# Patient Record
Sex: FEMALE | Race: WHITE | NOT HISPANIC OR LATINO | Employment: OTHER | URBAN - METROPOLITAN AREA
[De-identification: names, ages, dates, MRNs, and addresses within clinical notes are randomized per-mention and may not be internally consistent; named-entity substitution may affect disease eponyms.]

---

## 2023-05-10 PROBLEM — N89.8 VAGINAL DISCHARGE: Status: ACTIVE | Noted: 2023-05-10

## 2023-05-10 PROBLEM — Z86.19 HISTORY OF POSITIVE HEPATITIS C: Status: ACTIVE | Noted: 2023-05-10

## 2023-05-10 PROBLEM — M77.8 TENDINITIS OF RIGHT SHOULDER: Status: ACTIVE | Noted: 2023-05-10

## 2023-05-10 PROBLEM — I10 HTN (HYPERTENSION): Status: ACTIVE | Noted: 2023-05-10

## 2023-05-10 PROBLEM — E78.5 HYPERLIPIDEMIA: Status: ACTIVE | Noted: 2023-05-10

## 2023-05-10 PROBLEM — M75.41 IMPINGEMENT SYNDROME OF RIGHT SHOULDER: Status: ACTIVE | Noted: 2023-05-10

## 2023-05-10 PROBLEM — F17.200 SMOKING ADDICTION: Status: ACTIVE | Noted: 2023-05-10

## 2023-05-10 PROBLEM — E55.9 VITAMIN D DEFICIENCY: Status: ACTIVE | Noted: 2023-05-10

## 2023-05-10 PROBLEM — M75.81 ROTATOR CUFF TENDONITIS, RIGHT: Status: ACTIVE | Noted: 2023-05-10

## 2023-05-10 PROBLEM — F31.9 BIPOLAR 1 DISORDER (MULTI): Status: ACTIVE | Noted: 2023-05-10

## 2023-05-10 PROBLEM — E53.8 VITAMIN B12 DEFICIENCY: Status: ACTIVE | Noted: 2023-05-10

## 2023-05-10 PROBLEM — I50.22 CHRONIC SYSTOLIC CONGESTIVE HEART FAILURE (MULTI): Status: ACTIVE | Noted: 2023-05-10

## 2023-05-10 PROBLEM — S21.209A BACK WOUND: Status: ACTIVE | Noted: 2023-05-10

## 2023-05-10 PROBLEM — S39.012A STRAIN OF BACK: Status: ACTIVE | Noted: 2023-05-10

## 2023-05-10 PROBLEM — M17.11 PRIMARY OSTEOARTHRITIS OF RIGHT KNEE: Status: ACTIVE | Noted: 2023-05-10

## 2023-05-10 PROBLEM — M25.611 DECREASED RANGE OF MOTION OF RIGHT SHOULDER: Status: ACTIVE | Noted: 2023-05-10

## 2023-05-10 PROBLEM — H60.8X3 CHRONIC ECZEMATOUS OTITIS EXTERNA OF BOTH EARS: Status: ACTIVE | Noted: 2023-05-10

## 2023-05-10 PROBLEM — I42.0 CARDIOMYOPATHY, DILATED, NONISCHEMIC (MULTI): Status: ACTIVE | Noted: 2023-05-10

## 2023-05-10 PROBLEM — M75.51 BURSITIS OF RIGHT SHOULDER: Status: ACTIVE | Noted: 2023-05-10

## 2023-05-10 PROBLEM — N95.0 POSTMENOPAUSAL BLEEDING: Status: ACTIVE | Noted: 2023-05-10

## 2023-05-10 PROBLEM — R91.8 LUNG NODULES: Status: ACTIVE | Noted: 2023-05-10

## 2023-05-10 PROBLEM — K21.9 GERD (GASTROESOPHAGEAL REFLUX DISEASE): Status: ACTIVE | Noted: 2023-05-10

## 2023-05-10 PROBLEM — M25.561 ACUTE PAIN OF RIGHT KNEE: Status: ACTIVE | Noted: 2023-05-10

## 2023-05-10 PROBLEM — F17.200 NEEDS SMOKING CESSATION EDUCATION: Status: ACTIVE | Noted: 2023-05-10

## 2023-05-10 PROBLEM — M25.461 EFFUSION OF RIGHT KNEE: Status: ACTIVE | Noted: 2023-05-10

## 2023-05-10 PROBLEM — J84.82: Status: ACTIVE | Noted: 2023-05-10

## 2023-05-10 PROBLEM — J43.2 CENTRILOBULAR EMPHYSEMA (MULTI): Status: ACTIVE | Noted: 2023-05-10

## 2023-05-10 RX ORDER — ALBUTEROL SULFATE 90 UG/1
2 AEROSOL, METERED RESPIRATORY (INHALATION) EVERY 4 HOURS PRN
COMMUNITY
Start: 2021-05-25

## 2023-05-10 RX ORDER — ARIPIPRAZOLE 5 MG/1
5 TABLET ORAL DAILY
COMMUNITY
End: 2023-08-14 | Stop reason: SDUPTHER

## 2023-05-10 RX ORDER — CARVEDILOL 25 MG/1
25 TABLET ORAL
COMMUNITY
Start: 2015-05-28 | End: 2023-08-08

## 2023-05-10 RX ORDER — CYCLOBENZAPRINE HCL 10 MG
1 TABLET ORAL NIGHTLY PRN
COMMUNITY
Start: 2021-12-07 | End: 2023-11-08 | Stop reason: WASHOUT

## 2023-05-10 RX ORDER — SPIRONOLACTONE AND HYDROCHLOROTHIAZIDE 25; 25 MG/1; MG/1
1 TABLET ORAL DAILY
COMMUNITY
Start: 2021-05-25 | End: 2023-08-14 | Stop reason: SDUPTHER

## 2023-05-10 RX ORDER — ACETAMINOPHEN 500 MG
50 TABLET ORAL DAILY
COMMUNITY
Start: 2023-02-09 | End: 2023-08-14 | Stop reason: ALTCHOICE

## 2023-05-10 RX ORDER — LAMOTRIGINE 100 MG/1
1 TABLET ORAL DAILY
COMMUNITY
Start: 2021-10-19 | End: 2023-11-08 | Stop reason: WASHOUT

## 2023-05-10 RX ORDER — FUROSEMIDE 40 MG/1
1 TABLET ORAL DAILY
COMMUNITY
End: 2023-11-08 | Stop reason: WASHOUT

## 2023-05-10 RX ORDER — ACETAMINOPHEN, DEXTROMETHORPHAN HBR, DOXYLAMINE SUCCINATE, PHENYLEPHRINE HCL 650; 20; 12.5; 1 MG/30ML; MG/30ML; MG/30ML; MG/30ML
1 SOLUTION ORAL DAILY
COMMUNITY
Start: 2023-02-09

## 2023-05-10 RX ORDER — MELOXICAM 15 MG/1
1 TABLET ORAL DAILY
COMMUNITY
Start: 2023-04-06

## 2023-05-10 RX ORDER — AMLODIPINE BESYLATE 5 MG/1
1 TABLET ORAL DAILY
COMMUNITY
Start: 2015-04-28 | End: 2023-12-13

## 2023-05-10 RX ORDER — DOCUSATE SODIUM 100 MG/1
100 CAPSULE, LIQUID FILLED ORAL EVERY 12 HOURS PRN
COMMUNITY
Start: 2016-06-14 | End: 2023-11-08 | Stop reason: WASHOUT

## 2023-05-10 RX ORDER — PRAVASTATIN SODIUM 10 MG/1
10 TABLET ORAL NIGHTLY
COMMUNITY
Start: 2015-04-29 | End: 2023-08-14 | Stop reason: SDUPTHER

## 2023-05-10 RX ORDER — ERGOCALCIFEROL 1.25 MG/1
50000 CAPSULE ORAL
COMMUNITY
Start: 2021-01-20

## 2023-05-10 RX ORDER — BENZTROPINE MESYLATE 0.5 MG/1
1 TABLET ORAL NIGHTLY
COMMUNITY
Start: 2015-09-03 | End: 2023-08-14 | Stop reason: SDUPTHER

## 2023-05-10 RX ORDER — OXCARBAZEPINE 600 MG/1
1 TABLET, FILM COATED ORAL 2 TIMES DAILY
COMMUNITY
Start: 2015-09-03 | End: 2024-02-06 | Stop reason: SDUPTHER

## 2023-05-10 RX ORDER — LISINOPRIL 40 MG/1
1 TABLET ORAL DAILY
COMMUNITY
Start: 2015-04-29 | End: 2023-05-12 | Stop reason: SINTOL

## 2023-05-12 ENCOUNTER — OFFICE VISIT (OUTPATIENT)
Dept: PRIMARY CARE | Facility: CLINIC | Age: 63
End: 2023-05-12
Payer: COMMERCIAL

## 2023-05-12 VITALS
SYSTOLIC BLOOD PRESSURE: 128 MMHG | HEIGHT: 65 IN | HEART RATE: 68 BPM | DIASTOLIC BLOOD PRESSURE: 70 MMHG | WEIGHT: 153 LBS | BODY MASS INDEX: 25.49 KG/M2

## 2023-05-12 DIAGNOSIS — M85.852 OSTEOPENIA OF BOTH HIPS: ICD-10-CM

## 2023-05-12 DIAGNOSIS — E78.2 MIXED HYPERLIPIDEMIA: ICD-10-CM

## 2023-05-12 DIAGNOSIS — Z00.00 WELCOME TO MEDICARE PREVENTIVE VISIT: Primary | ICD-10-CM

## 2023-05-12 DIAGNOSIS — F17.200 NEEDS SMOKING CESSATION EDUCATION: ICD-10-CM

## 2023-05-12 DIAGNOSIS — J84.82 ADULT PULMONARY LANGERHANS CELL HISTIOCYTOSIS (MULTI): ICD-10-CM

## 2023-05-12 DIAGNOSIS — M85.851 OSTEOPENIA OF BOTH HIPS: ICD-10-CM

## 2023-05-12 DIAGNOSIS — H60.8X3 CHRONIC ECZEMATOUS OTITIS EXTERNA OF BOTH EARS: ICD-10-CM

## 2023-05-12 DIAGNOSIS — I42.0 CARDIOMYOPATHY, DILATED, NONISCHEMIC (MULTI): ICD-10-CM

## 2023-05-12 DIAGNOSIS — I50.22 CHRONIC SYSTOLIC CONGESTIVE HEART FAILURE (MULTI): ICD-10-CM

## 2023-05-12 DIAGNOSIS — F17.200 SMOKING ADDICTION: ICD-10-CM

## 2023-05-12 DIAGNOSIS — J43.2 CENTRILOBULAR EMPHYSEMA (MULTI): ICD-10-CM

## 2023-05-12 DIAGNOSIS — F31.9 BIPOLAR 1 DISORDER (MULTI): ICD-10-CM

## 2023-05-12 PROBLEM — S39.012A STRAIN OF BACK: Status: RESOLVED | Noted: 2023-05-10 | Resolved: 2023-05-12

## 2023-05-12 PROBLEM — M75.41 IMPINGEMENT SYNDROME OF RIGHT SHOULDER: Status: RESOLVED | Noted: 2023-05-10 | Resolved: 2023-05-12

## 2023-05-12 PROBLEM — S21.209A BACK WOUND: Status: RESOLVED | Noted: 2023-05-10 | Resolved: 2023-05-12

## 2023-05-12 PROBLEM — M75.51 BURSITIS OF RIGHT SHOULDER: Status: RESOLVED | Noted: 2023-05-10 | Resolved: 2023-05-12

## 2023-05-12 PROBLEM — M25.461 EFFUSION OF RIGHT KNEE: Status: RESOLVED | Noted: 2023-05-10 | Resolved: 2023-05-12

## 2023-05-12 PROBLEM — M77.8 TENDINITIS OF RIGHT SHOULDER: Status: RESOLVED | Noted: 2023-05-10 | Resolved: 2023-05-12

## 2023-05-12 PROBLEM — M25.611 DECREASED RANGE OF MOTION OF RIGHT SHOULDER: Status: RESOLVED | Noted: 2023-05-10 | Resolved: 2023-05-12

## 2023-05-12 PROBLEM — M25.561 ACUTE PAIN OF RIGHT KNEE: Status: RESOLVED | Noted: 2023-05-10 | Resolved: 2023-05-12

## 2023-05-12 LAB
CHLAMYDIA TRACH., AMPLIFIED: NORMAL
N. GONORRHEA, AMPLIFIED: NORMAL
TRICHOMONAS VAGINALIS: NORMAL

## 2023-05-12 PROCEDURE — 99396 PREV VISIT EST AGE 40-64: CPT | Performed by: INTERNAL MEDICINE

## 2023-05-12 PROCEDURE — G0446 INTENS BEHAVE THER CARDIO DX: HCPCS | Performed by: INTERNAL MEDICINE

## 2023-05-12 PROCEDURE — 93000 ELECTROCARDIOGRAM COMPLETE: CPT | Performed by: INTERNAL MEDICINE

## 2023-05-12 PROCEDURE — 3074F SYST BP LT 130 MM HG: CPT | Performed by: INTERNAL MEDICINE

## 2023-05-12 PROCEDURE — 99406 BEHAV CHNG SMOKING 3-10 MIN: CPT | Performed by: INTERNAL MEDICINE

## 2023-05-12 PROCEDURE — 99497 ADVNCD CARE PLAN 30 MIN: CPT | Performed by: INTERNAL MEDICINE

## 2023-05-12 PROCEDURE — 99214 OFFICE O/P EST MOD 30 MIN: CPT | Performed by: INTERNAL MEDICINE

## 2023-05-12 PROCEDURE — G0438 PPPS, INITIAL VISIT: HCPCS | Performed by: INTERNAL MEDICINE

## 2023-05-12 PROCEDURE — 3078F DIAST BP <80 MM HG: CPT | Performed by: INTERNAL MEDICINE

## 2023-05-12 RX ORDER — UMECLIDINIUM BROMIDE AND VILANTEROL TRIFENATATE 62.5; 25 UG/1; UG/1
1 POWDER RESPIRATORY (INHALATION) DAILY
Qty: 30 EACH | Refills: 6 | Status: SHIPPED | OUTPATIENT
Start: 2023-05-12 | End: 2024-02-06 | Stop reason: SDUPTHER

## 2023-05-12 RX ORDER — LOSARTAN POTASSIUM 100 MG/1
100 TABLET ORAL DAILY
Qty: 30 TABLET | Refills: 5 | Status: SHIPPED | OUTPATIENT
Start: 2023-05-12 | End: 2023-06-05

## 2023-05-12 RX ORDER — MOMETASONE FUROATE 1 MG/G
CREAM TOPICAL DAILY
Qty: 15 G | Refills: 3 | Status: SHIPPED | OUTPATIENT
Start: 2023-05-12 | End: 2023-11-08 | Stop reason: WASHOUT

## 2023-05-12 ASSESSMENT — ANXIETY QUESTIONNAIRES
IF YOU CHECKED OFF ANY PROBLEMS ON THIS QUESTIONNAIRE, HOW DIFFICULT HAVE THESE PROBLEMS MADE IT FOR YOU TO DO YOUR WORK, TAKE CARE OF THINGS AT HOME, OR GET ALONG WITH OTHER PEOPLE: NOT DIFFICULT AT ALL
1. FEELING NERVOUS, ANXIOUS, OR ON EDGE: NOT AT ALL
2. NOT BEING ABLE TO STOP OR CONTROL WORRYING: NOT AT ALL
5. BEING SO RESTLESS THAT IT IS HARD TO SIT STILL: NOT AT ALL
7. FEELING AFRAID AS IF SOMETHING AWFUL MIGHT HAPPEN: NOT AT ALL
6. BECOMING EASILY ANNOYED OR IRRITABLE: NOT AT ALL
4. TROUBLE RELAXING: NOT AT ALL
GAD7 TOTAL SCORE: 0
3. WORRYING TOO MUCH ABOUT DIFFERENT THINGS: NOT AT ALL

## 2023-05-12 ASSESSMENT — ACTIVITIES OF DAILY LIVING (ADL)
BATHING: INDEPENDENT
GROCERY_SHOPPING: INDEPENDENT
TAKING_MEDICATION: INDEPENDENT
DOING_HOUSEWORK: INDEPENDENT
BATHING: INDEPENDENT
TAKING_MEDICATION: INDEPENDENT
DOING_HOUSEWORK: INDEPENDENT
DRESSING: INDEPENDENT
DRESSING: INDEPENDENT
MANAGING_FINANCES: INDEPENDENT
GROCERY_SHOPPING: INDEPENDENT
MANAGING_FINANCES: INDEPENDENT

## 2023-05-12 ASSESSMENT — ENCOUNTER SYMPTOMS
OCCASIONAL FEELINGS OF UNSTEADINESS: 0
DEPRESSION: 0
LOSS OF SENSATION IN FEET: 0

## 2023-05-12 ASSESSMENT — PATIENT HEALTH QUESTIONNAIRE - PHQ9
2. FEELING DOWN, DEPRESSED OR HOPELESS: NOT AT ALL
SUM OF ALL RESPONSES TO PHQ9 QUESTIONS 1 AND 2: 0
1. LITTLE INTEREST OR PLEASURE IN DOING THINGS: NOT AT ALL

## 2023-05-12 NOTE — PROGRESS NOTES
"Subjective   Reason for Visit: Danielle Dickinosn is an 63 y.o. female here for a Medicare Wellness visit.     Past Medical, Surgical, and Family History reviewed and updated in chart.    Reviewed all medications by prescribing practitioner or clinical pharmacist (such as prescriptions, OTCs, herbal therapies and supplements) and documented in the medical record.    HPI    Patient Care Team:  Nelson Leavitt DO as PCP - General  Nelson Leavitt DO as PCP - [employer] PCP     Review of Systems    Objective   Vitals:  /70   Pulse 68   Ht 1.651 m (5' 5\")   Wt 69.4 kg (153 lb)   BMI 25.46 kg/m²       Physical Exam    Assessment/Plan   Problem List Items Addressed This Visit    None         "

## 2023-05-12 NOTE — ASSESSMENT & PLAN NOTE
Prescribe mometasone cream 0.1% daily to treat external areas.  Ear exam revealed no evidence of inflammation or infection patient with history of salivary gland surgery with history of stone and abscess requests referral to ENT for reevaluation

## 2023-05-12 NOTE — ASSESSMENT & PLAN NOTE
Pulmonary function testing that was completed December 26, 2019 revealed an FEV1 of 71% with a 13% improvement with bronchodilator FEV1 FVC of 94% postbronchodilator percent with mild to moderate obstructive airways disease patient continues smoking given smoking cessation counseling

## 2023-05-12 NOTE — PROGRESS NOTES
I spent more than 3 minutes but less than 10 minutes counseling patient about need for smoking/tobacco cessation and how I can get support efforts when patient is ready to quit.  Discussed nicotine replacement therapy such as bupropion nicotine replacement therapy and Chantix .  Hypnosis,support groups,and acupuncture are other potential options.Depression and anxiety screening completed   PHQ9 score   GAD7 score   I spent 15 minutes obtaining and discussing depression screening using PHQ 2 questions with results documented in chart.  Screening using PHQ-9 and SOFIA-7 scores were used for follow-up with treatment and referral plan discussed.      I spent 15 minutes face-to-face with this individual discussing the cardiovascular risk and behavioral therapies of nutritional choices exercise and elimination of habits contributing to risk .We agreed on a plan how they may be able to reduce  current cardiovascular risk.  Per patient with calculation greater than 10% aspirin use was discussed and encouraged unless known allergy or increased risk of bleeding contraindicates use patient's 10-year CV risk estimate calculates:I spent greater than 15 minutes discussing advance care planning including the explanation and discussion of advanced directives.  If patient does not have current up-to-date documents examples and information provided on how to create both living will and power of .  toolkit was given to patient and was encouraged to work out completing these documents.Subjective   Reason for Visit: Danielle Dickinson is an 63 y.o. female here for a Medicare Wellness visit.     Past Medical, Surgical, and Family History reviewed and updated in chart.         HPI    Patient Care Team:  Nelson Leavitt DO as PCP - General  Nelson Leavitt DO as PCP - [employer] PCP     Review of Systems   All other systems reviewed and are negative.      Objective   Vitals:  /70   Pulse 68   Ht 1.651 m  "(5' 5\")   Wt 69.4 kg (153 lb)   BMI 25.46 kg/m²       Physical Exam  Vitals and nursing note reviewed.   Constitutional:       General: She is not in acute distress.     Appearance: Normal appearance. She is well-developed. She is not toxic-appearing.   HENT:      Head: Normocephalic and atraumatic.      Right Ear: Tympanic membrane and external ear normal.      Left Ear: Tympanic membrane and external ear normal.      Nose: Nose normal.      Mouth/Throat:      Mouth: Mucous membranes are moist.      Pharynx: Oropharynx is clear. No oropharyngeal exudate or posterior oropharyngeal erythema.      Tonsils: No tonsillar exudate. 2+ on the right. 2+ on the left.   Eyes:      Extraocular Movements: Extraocular movements intact.      Conjunctiva/sclera: Conjunctivae normal.   Cardiovascular:      Rate and Rhythm: Normal rate and regular rhythm.      Pulses: Normal pulses.      Heart sounds: Normal heart sounds. No murmur heard.  Pulmonary:      Effort: Pulmonary effort is normal.      Breath sounds: Normal breath sounds.   Abdominal:      General: Abdomen is flat. Bowel sounds are normal.      Palpations: Abdomen is soft.   Musculoskeletal:      Cervical back: Neck supple.   Lymphadenopathy:      Cervical: No cervical adenopathy.   Skin:     General: Skin is warm and dry.      Findings: No rash.   Neurological:      Mental Status: She is alert. Mental status is at baseline.   Psychiatric:         Mood and Affect: Mood normal.         Behavior: Behavior normal.         Thought Content: Thought content normal.         Judgment: Judgment normal.         Assessment/Plan   Problem List Items Addressed This Visit       Adult pulmonary Langerhans cell histiocytosis (CMS/HCC)    Current Assessment & Plan     Stable at this time persistent cough start Anoro         Relevant Orders    Referral to ENT    Follow Up In [facility] - PCP    Bipolar 1 disorder (CMS/HCC)    Current Assessment & Plan     Patient with history " of substance abuse and major depression mood stabilization continue with Abilify 5 mg daily and Lamictal 100 mg daily follows with psychiatry is under counseling and substance abuse rehabilitation         Cardiomyopathy, dilated, nonischemic (CMS/HCC)    Current Assessment & Plan     Well compensated patient with nonproductive cough suggestive of ACE inhibitor cough switch lisinopril 40 mg daily to losartan 100 mg daily and reassess         Relevant Orders    Referral to ENT    Follow Up In [facility] - PCP    Centrilobular emphysema (CMS/[facility])    Current Assessment & Plan     Pulmonary function testing that was completed December 26, 2019 revealed an FEV1 of 71% with a 13% improvement with bronchodilator FEV1 FVC of 94% postbronchodilator percent with mild to moderate obstructive airways disease patient continues smoking given smoking cessation counseling         Relevant Orders    Referral to ENT    Follow Up In [facility] - PCP    Chronic eczematous otitis externa of both ears    Current Assessment & Plan     Prescribe mometasone cream 0.1% daily to treat external areas.  Ear exam revealed no evidence of inflammation or infection patient with history of salivary gland surgery with history of stone and abscess requests referral to ENT for reevaluation         Relevant Orders    Referral to ENT    Follow Up In [facility] - PCP    Chronic systolic congestive heart failure (CMS/[facility])    Current Assessment & Plan     Well compensated at this time continue carvedilol 25 mg twice a day losartan replacing lisinopril 100 mg daily amlodipine 5 mg daily and spironolactone/hydrochlorothiazide 25/25 1 daily consider repeat echo with cardiology evaluation annual basis         Relevant Orders    Referral to ENT    Follow Up In [facility] - PCP    Hyperlipidemia    Current Assessment & Plan     Continue pravastatin 10 mg daily         Relevant Orders    Referral to ENT    Follow Up In  [facility] - PCP    Needs smoking cessation education    Relevant Orders    Referral to ENT    Follow Up In [facility] - PCP    Smoking addiction    Current Assessment & Plan     Continue to work at smoking cessation less than half pack of cigarettes a day         Relevant Orders    Referral to ENT    Follow Up In [facility] - PCP    Welcome to Medicare preventive visit - Primary    Current Assessment & Plan     Up-to-date with screenings including mammography completed bone density completed Cologuard testing completed March 1, 2023 negative okay for 3-year surveillance lung cancer screening completed stable in March continue annual follow-up had recent cervical cancer screening completed in March by gynecologist nicola with follow-up continue smoking cessation Shingrix vaccine will be obtained at pharmacy patient defers pneumococcal vaccinations until next visit reevaluate in 3 months         Relevant Orders    ECG 12 lead (Clinic Performed) (Completed)    Visual acuity screening (Completed)    Referral to ENT    Follow Up In [facility] - PCP    Osteopenia of both hips    Current Assessment & Plan     Reviewed bone density osteopenia mild reevaluate in 2 years when turns 65         Relevant Orders    Referral to ENT    Follow Up In [facility] - PCP

## 2023-05-12 NOTE — ASSESSMENT & PLAN NOTE
Up-to-date with screenings including mammography completed bone density completed Cologuard testing completed March 1, 2023 negative okay for 3-year surveillance lung cancer screening completed stable in March continue annual follow-up had recent cervical cancer screening completed in March by gynecologist nicola with follow-up continue smoking cessation Shingrix vaccine will be obtained at pharmacy patient defers pneumococcal vaccinations until next visit reevaluate in 3 months

## 2023-05-12 NOTE — ASSESSMENT & PLAN NOTE
Patient with history of substance abuse and major depression mood stabilization continue with Abilify 5 mg daily and Lamictal 100 mg daily follows with psychiatry is under counseling and substance abuse rehabilitation

## 2023-05-12 NOTE — ASSESSMENT & PLAN NOTE
Well compensated patient with nonproductive cough suggestive of ACE inhibitor cough switch lisinopril 40 mg daily to losartan 100 mg daily and reassess  Addendum: Reviewed EKG in office normal sinus rhythm without ischemic change compared to previous EKG no changes element of left ventricular strain pattern please see the note on EKG recorded

## 2023-05-12 NOTE — ASSESSMENT & PLAN NOTE
>>ASSESSMENT AND PLAN FOR HYPERTENSIVE CARDIOMYOPATHY, WITH HEART FAILURE (CMS/HCC) WRITTEN ON 8/14/2023  9:48 AM BY HEBER MYLES,     >>ASSESSMENT AND PLAN FOR CARDIOMYOPATHY, DILATED, NONISCHEMIC (CMS/HCC) WRITTEN ON 6/28/2023  9:16 AM BY HEBER MYLES,     Well compensated patient with nonproductive cough suggestive of ACE inhibitor cough switch lisinopril 40 mg daily to losartan 100 mg daily and reassess  Addendum: Reviewed EKG in office normal sinus rhythm without ischemic change compared to previous EKG no changes element of left ventricular strain pattern please see the note on EKG recorded    >>ASSESSMENT AND PLAN FOR CHRONIC SYSTOLIC CONGESTIVE HEART FAILURE (CMS/HCC) WRITTEN ON 5/12/2023 10:34 AM BY HEBER MYLES,     Well compensated at this time continue carvedilol 25 mg twice a day losartan replacing lisinopril 100 mg daily amlodipine 5 mg daily and spironolactone/hydrochlorothiazide 25/25 1 daily consider repeat echo with cardiology evaluation annual basis

## 2023-05-12 NOTE — ASSESSMENT & PLAN NOTE
>>ASSESSMENT AND PLAN FOR SMOKING ADDICTION WRITTEN ON 5/12/2023 10:37 AM BY HEBER MYLES, DO    Continue to work at smoking cessation less than half pack of cigarettes a day

## 2023-05-12 NOTE — ASSESSMENT & PLAN NOTE
Well compensated at this time continue carvedilol 25 mg twice a day losartan replacing lisinopril 100 mg daily amlodipine 5 mg daily and spironolactone/hydrochlorothiazide 25/25 1 daily consider repeat echo with cardiology evaluation annual basis

## 2023-06-04 DIAGNOSIS — F17.200 NEEDS SMOKING CESSATION EDUCATION: ICD-10-CM

## 2023-06-04 DIAGNOSIS — I42.0 CARDIOMYOPATHY, DILATED, NONISCHEMIC (MULTI): ICD-10-CM

## 2023-06-04 DIAGNOSIS — F17.200 SMOKING ADDICTION: ICD-10-CM

## 2023-06-04 DIAGNOSIS — H60.8X3 CHRONIC ECZEMATOUS OTITIS EXTERNA OF BOTH EARS: ICD-10-CM

## 2023-06-04 DIAGNOSIS — M85.852 OSTEOPENIA OF BOTH HIPS: ICD-10-CM

## 2023-06-04 DIAGNOSIS — Z00.00 WELCOME TO MEDICARE PREVENTIVE VISIT: ICD-10-CM

## 2023-06-04 DIAGNOSIS — E78.2 MIXED HYPERLIPIDEMIA: ICD-10-CM

## 2023-06-04 DIAGNOSIS — M85.851 OSTEOPENIA OF BOTH HIPS: ICD-10-CM

## 2023-06-04 DIAGNOSIS — J43.2 CENTRILOBULAR EMPHYSEMA (MULTI): ICD-10-CM

## 2023-06-04 DIAGNOSIS — J84.82 ADULT PULMONARY LANGERHANS CELL HISTIOCYTOSIS (MULTI): ICD-10-CM

## 2023-06-04 DIAGNOSIS — I50.22 CHRONIC SYSTOLIC CONGESTIVE HEART FAILURE (MULTI): ICD-10-CM

## 2023-06-05 RX ORDER — LOSARTAN POTASSIUM 100 MG/1
TABLET ORAL
Qty: 30 TABLET | Refills: 5 | Status: SHIPPED | OUTPATIENT
Start: 2023-06-05 | End: 2023-07-03

## 2023-06-20 ENCOUNTER — TELEPHONE (OUTPATIENT)
Dept: PRIMARY CARE | Facility: CLINIC | Age: 63
End: 2023-06-20
Payer: COMMERCIAL

## 2023-06-20 DIAGNOSIS — F17.200 NEEDS SMOKING CESSATION EDUCATION: ICD-10-CM

## 2023-06-20 DIAGNOSIS — F17.200 SMOKING ADDICTION: ICD-10-CM

## 2023-06-20 RX ORDER — IBUPROFEN 200 MG
1 TABLET ORAL EVERY 24 HOURS
Qty: 30 PATCH | Refills: 0 | Status: SHIPPED | OUTPATIENT
Start: 2023-06-20 | End: 2023-08-14 | Stop reason: SDUPTHER

## 2023-07-03 DIAGNOSIS — F17.200 SMOKING ADDICTION: ICD-10-CM

## 2023-07-03 DIAGNOSIS — F17.200 NEEDS SMOKING CESSATION EDUCATION: ICD-10-CM

## 2023-07-03 DIAGNOSIS — I42.0 CARDIOMYOPATHY, DILATED, NONISCHEMIC (MULTI): ICD-10-CM

## 2023-07-03 DIAGNOSIS — M85.852 OSTEOPENIA OF BOTH HIPS: ICD-10-CM

## 2023-07-03 DIAGNOSIS — H60.8X3 CHRONIC ECZEMATOUS OTITIS EXTERNA OF BOTH EARS: ICD-10-CM

## 2023-07-03 DIAGNOSIS — J43.2 CENTRILOBULAR EMPHYSEMA (MULTI): ICD-10-CM

## 2023-07-03 DIAGNOSIS — J84.82 ADULT PULMONARY LANGERHANS CELL HISTIOCYTOSIS (MULTI): ICD-10-CM

## 2023-07-03 DIAGNOSIS — Z00.00 WELCOME TO MEDICARE PREVENTIVE VISIT: ICD-10-CM

## 2023-07-03 DIAGNOSIS — M85.851 OSTEOPENIA OF BOTH HIPS: ICD-10-CM

## 2023-07-03 DIAGNOSIS — E78.2 MIXED HYPERLIPIDEMIA: ICD-10-CM

## 2023-07-03 DIAGNOSIS — I50.22 CHRONIC SYSTOLIC CONGESTIVE HEART FAILURE (MULTI): ICD-10-CM

## 2023-07-03 RX ORDER — LOSARTAN POTASSIUM 100 MG/1
TABLET ORAL
Qty: 90 TABLET | Refills: 1 | Status: SHIPPED | OUTPATIENT
Start: 2023-07-03 | End: 2024-03-19

## 2023-08-08 ENCOUNTER — HOSPITAL ENCOUNTER (OUTPATIENT)
Dept: DATA CONVERSION | Facility: HOSPITAL | Age: 63
End: 2023-08-08
Attending: OBSTETRICS & GYNECOLOGY | Admitting: OBSTETRICS & GYNECOLOGY
Payer: COMMERCIAL

## 2023-08-08 DIAGNOSIS — I42.0 DILATED CARDIOMYOPATHY (MULTI): ICD-10-CM

## 2023-08-08 DIAGNOSIS — N95.0 POSTMENOPAUSAL BLEEDING: ICD-10-CM

## 2023-08-08 LAB
AMPHETAMINE (PRESENCE) IN URINE BY SCREEN METHOD: ABNORMAL
BARBITURATES PRESENCE IN URINE BY SCREEN METHOD: ABNORMAL
BENZODIAZEPINE (PRESENCE) IN URINE BY SCREEN METHOD: ABNORMAL
CANNABINOIDS IN URINE BY SCREEN METHOD: ABNORMAL
COCAINE (PRESENCE) IN URINE BY SCREEN METHOD: ABNORMAL
DRUG SCREEN COMMENT URINE: ABNORMAL
DRUG SCREEN COMMENT URINE: NORMAL
FENTANYL URINE: ABNORMAL
METHADONE (PRESENCE) IN URINE BY SCREEN METHOD: ABNORMAL
OPIATES (PRESENCE) IN URINE BY SCREEN METHOD: ABNORMAL
OXYCODONE (PRESENCE) IN URINE BY SCREEN METHOD: ABNORMAL
PHENCYCLIDINE (PRESENCE) IN URINE BY SCREEN METHOD: ABNORMAL

## 2023-08-08 RX ORDER — CARVEDILOL 25 MG/1
TABLET ORAL
Qty: 180 TABLET | Refills: 3 | Status: SHIPPED | OUTPATIENT
Start: 2023-08-08

## 2023-08-10 LAB
COMPLETE PATHOLOGY REPORT: NORMAL
CONVERTED CLINICAL DIAGNOSIS-HISTORY: NORMAL
CONVERTED FINAL DIAGNOSIS: NORMAL
CONVERTED FINAL REPORT PDF LINK TO COPY AND PASTE: NORMAL
CONVERTED GROSS DESCRIPTION: NORMAL

## 2023-08-12 LAB — BENZOYLECGONINE, URINE: >1000 NG/ML

## 2023-08-14 ENCOUNTER — OFFICE VISIT (OUTPATIENT)
Dept: PRIMARY CARE | Facility: CLINIC | Age: 63
End: 2023-08-14
Payer: COMMERCIAL

## 2023-08-14 VITALS
SYSTOLIC BLOOD PRESSURE: 122 MMHG | HEART RATE: 66 BPM | DIASTOLIC BLOOD PRESSURE: 80 MMHG | HEIGHT: 64 IN | BODY MASS INDEX: 27.31 KG/M2 | WEIGHT: 160 LBS

## 2023-08-14 DIAGNOSIS — H60.8X3 CHRONIC ECZEMATOUS OTITIS EXTERNA OF BOTH EARS: ICD-10-CM

## 2023-08-14 DIAGNOSIS — M17.11 PRIMARY OSTEOARTHRITIS OF RIGHT KNEE: ICD-10-CM

## 2023-08-14 DIAGNOSIS — F31.9 BIPOLAR 1 DISORDER (MULTI): ICD-10-CM

## 2023-08-14 DIAGNOSIS — F14.11 COCAINE ABUSE IN REMISSION (MULTI): ICD-10-CM

## 2023-08-14 DIAGNOSIS — I50.22 CHRONIC SYSTOLIC CONGESTIVE HEART FAILURE (MULTI): ICD-10-CM

## 2023-08-14 DIAGNOSIS — J84.82 ADULT PULMONARY LANGERHANS CELL HISTIOCYTOSIS (MULTI): ICD-10-CM

## 2023-08-14 DIAGNOSIS — I43 HYPERTENSIVE CARDIOMYOPATHY, WITH HEART FAILURE (MULTI): Primary | ICD-10-CM

## 2023-08-14 DIAGNOSIS — F17.200 SMOKING ADDICTION: ICD-10-CM

## 2023-08-14 DIAGNOSIS — M85.851 OSTEOPENIA OF BOTH HIPS: ICD-10-CM

## 2023-08-14 DIAGNOSIS — F19.90 SUBSTANCE USE DISORDER: ICD-10-CM

## 2023-08-14 DIAGNOSIS — M85.852 OSTEOPENIA OF BOTH HIPS: ICD-10-CM

## 2023-08-14 DIAGNOSIS — E55.9 VITAMIN D DEFICIENCY: ICD-10-CM

## 2023-08-14 DIAGNOSIS — R73.02 IGT (IMPAIRED GLUCOSE TOLERANCE): ICD-10-CM

## 2023-08-14 DIAGNOSIS — J43.2 CENTRILOBULAR EMPHYSEMA (MULTI): ICD-10-CM

## 2023-08-14 DIAGNOSIS — I42.0 CARDIOMYOPATHY, DILATED, NONISCHEMIC (MULTI): ICD-10-CM

## 2023-08-14 DIAGNOSIS — E53.8 VITAMIN B12 DEFICIENCY: ICD-10-CM

## 2023-08-14 DIAGNOSIS — I11.0 HYPERTENSIVE CARDIOMYOPATHY, WITH HEART FAILURE (MULTI): Primary | ICD-10-CM

## 2023-08-14 DIAGNOSIS — E78.2 MIXED HYPERLIPIDEMIA: ICD-10-CM

## 2023-08-14 DIAGNOSIS — F17.200 NEEDS SMOKING CESSATION EDUCATION: ICD-10-CM

## 2023-08-14 PROBLEM — F14.10 COCAINE ABUSE (MULTI): Status: ACTIVE | Noted: 2023-08-14

## 2023-08-14 PROCEDURE — 90732 PPSV23 VACC 2 YRS+ SUBQ/IM: CPT | Performed by: INTERNAL MEDICINE

## 2023-08-14 PROCEDURE — 99213 OFFICE O/P EST LOW 20 MIN: CPT | Performed by: INTERNAL MEDICINE

## 2023-08-14 PROCEDURE — G0009 ADMIN PNEUMOCOCCAL VACCINE: HCPCS | Performed by: INTERNAL MEDICINE

## 2023-08-14 RX ORDER — BENZTROPINE MESYLATE 0.5 MG/1
0.5 TABLET ORAL NIGHTLY
Qty: 30 TABLET | Refills: 6 | Status: SHIPPED | OUTPATIENT
Start: 2023-08-14 | End: 2024-03-20 | Stop reason: SDUPTHER

## 2023-08-14 RX ORDER — IBUPROFEN 200 MG
1 TABLET ORAL EVERY 24 HOURS
Qty: 30 PATCH | Refills: 0 | Status: SHIPPED | OUTPATIENT
Start: 2023-08-14 | End: 2023-11-08 | Stop reason: WASHOUT

## 2023-08-14 RX ORDER — PRAVASTATIN SODIUM 10 MG/1
10 TABLET ORAL NIGHTLY
Qty: 90 TABLET | Refills: 3 | Status: SHIPPED | OUTPATIENT
Start: 2023-08-14 | End: 2024-02-06 | Stop reason: WASHOUT

## 2023-08-14 RX ORDER — SPIRONOLACTONE AND HYDROCHLOROTHIAZIDE 25; 25 MG/1; MG/1
1 TABLET ORAL DAILY
Qty: 90 TABLET | Refills: 3 | Status: SHIPPED | OUTPATIENT
Start: 2023-08-14

## 2023-08-14 RX ORDER — ARIPIPRAZOLE 5 MG/1
5 TABLET ORAL NIGHTLY
Qty: 30 TABLET | Refills: 6 | Status: SHIPPED | OUTPATIENT
Start: 2023-08-14 | End: 2023-09-05

## 2023-08-14 NOTE — ASSESSMENT & PLAN NOTE
Patient quit for 2 months but is now smoking again but would like to have reinitiate nicotine patch therapy and quit hotline

## 2023-08-14 NOTE — ASSESSMENT & PLAN NOTE
Stable on Anoro inhaler with albuterol rescue continue surveillance of lung nodules February 2023 stable

## 2023-08-14 NOTE — ASSESSMENT & PLAN NOTE
>>ASSESSMENT AND PLAN FOR SMOKING ADDICTION WRITTEN ON 8/14/2023 10:53 AM BY HEBER MYLES, DO    Patient quit for 2 months but is now smoking again but would like to have reinitiate nicotine patch therapy and quit hotline

## 2023-08-14 NOTE — ASSESSMENT & PLAN NOTE
Well compensated refilled hydrochlorothiazide spironolactone no signs or symptoms of congestive heart failure at this time

## 2023-08-14 NOTE — ASSESSMENT & PLAN NOTE
Patient reporting difficulty with sleep only sleeping approximately 2 hours highly suggest patient's start back on Abilify 5 mg nightly with benztropine continue on Lamictal 100 mg twice a day for mood stabilization

## 2023-08-14 NOTE — PROGRESS NOTES
"Subjective   Reason for Visit: Danielle Dickinson is an 63 y.o. female here for a fu  visit.     Past Medical, Surgical, and Family History reviewed and updated in chart.    Reviewed all medications by prescribing practitioner or clinical pharmacist (such as prescriptions, OTCs, herbal therapies and supplements) and documented in the medical record.    HPI    Patient Care Team:  Nelson Leavitt DO as PCP - General  Nelson Leavitt DO as PCP - [employer] PCP     Review of Systems   All other systems reviewed and are negative.      Objective   Vitals:  /80 (BP Location: Right arm, Patient Position: Sitting)   Pulse 66   Ht 1.626 m (5' 4\")   Wt 72.6 kg (160 lb)   BMI 27.46 kg/m²       Physical Exam  Vitals and nursing note reviewed.   Constitutional:       General: She is not in acute distress.     Appearance: Normal appearance. She is well-developed. She is not toxic-appearing.   HENT:      Head: Normocephalic and atraumatic.      Right Ear: Tympanic membrane and external ear normal.      Left Ear: Tympanic membrane and external ear normal.      Nose: Nose normal.      Mouth/Throat:      Mouth: Mucous membranes are moist.      Pharynx: Oropharynx is clear. No oropharyngeal exudate or posterior oropharyngeal erythema.      Tonsils: No tonsillar exudate. 2+ on the right. 2+ on the left.   Eyes:      Extraocular Movements: Extraocular movements intact.      Conjunctiva/sclera: Conjunctivae normal.   Cardiovascular:      Rate and Rhythm: Normal rate and regular rhythm.      Pulses: Normal pulses.      Heart sounds: Normal heart sounds. No murmur heard.  Pulmonary:      Effort: Pulmonary effort is normal.      Breath sounds: Normal breath sounds.   Abdominal:      General: Abdomen is flat. Bowel sounds are normal.      Palpations: Abdomen is soft.   Musculoskeletal:      Cervical back: Neck supple.   Lymphadenopathy:      Cervical: No cervical adenopathy.   Skin:     General: Skin is warm and " dry.      Findings: No rash.   Neurological:      Mental Status: She is alert. Mental status is at baseline.   Psychiatric:         Mood and Affect: Mood normal.         Behavior: Behavior normal.         Thought Content: Thought content normal.         Judgment: Judgment normal.         Assessment/Plan   Problem List Items Addressed This Visit       Adult pulmonary Langerhans cell histiocytosis (CMS/HCC)    Relevant Medications    ARIPiprazole (Abilify) 5 mg tablet    benztropine (Cogentin) 0.5 mg tablet    Other Relevant Orders    Comprehensive Metabolic Panel    Hemoglobin A1C    Lipid Panel    Albumin , Urine Random    Vitamin D 25-Hydroxy,Total    Vitamin B12    Follow Up In [facility] - PCP - Established    Bipolar 1 disorder (CMS/[facility])    Current Assessment & Plan     Patient reporting difficulty with sleep only sleeping approximately 2 hours highly suggest patient's start back on Abilify 5 mg nightly with benztropine continue on Lamictal 100 mg twice a day for mood stabilization         Relevant Medications    ARIPiprazole (Abilify) 5 mg tablet    benztropine (Cogentin) 0.5 mg tablet    Other Relevant Orders    Comprehensive Metabolic Panel    Hemoglobin A1C    Lipid Panel    Albumin , Urine Random    Vitamin D 25-Hydroxy,Total    Vitamin B12    Follow Up In [facility] - PCP - Established    Centrilobular emphysema (CMS/[facility])    Current Assessment & Plan     Stable on Anoro inhaler with albuterol rescue continue surveillance of lung nodules February 2023 stable         Relevant Medications    ARIPiprazole (Abilify) 5 mg tablet    benztropine (Cogentin) 0.5 mg tablet    Other Relevant Orders    Comprehensive Metabolic Panel    Hemoglobin A1C    Lipid Panel    Albumin , Urine Random    Vitamin D 25-Hydroxy,Total    Vitamin B12    Follow Up In [facility] - PCP - Established    Chronic eczematous otitis externa of both ears    Hyperlipidemia    Current Assessment & Plan      Reevaluate lipid profile with next visit         Relevant Medications    pravastatin (Pravachol) 10 mg tablet    Needs smoking cessation education    Current Assessment & Plan     >>ASSESSMENT AND PLAN FOR SMOKING ADDICTION WRITTEN ON 8/14/2023 10:53 AM BY HEBER MYLES,     Patient quit for 2 months but is now smoking again but would like to have reinitiate nicotine patch therapy and quit hotline         Relevant Medications    nicotine (Nicoderm CQ) 21 mg/24 hr patch    Primary osteoarthritis of right knee    Current Assessment & Plan     Has follow-up with orthopedic surgeon from [facility]         Vitamin B12 deficiency    Current Assessment & Plan     Continue 1000 mcg daily vitamin B12 reevaluate         Vitamin D deficiency    Current Assessment & Plan     Continue 50,000 units weekly vitamin D reevaluate         Osteopenia of both hips    Relevant Medications    ARIPiprazole (Abilify) 5 mg tablet    benztropine (Cogentin) 0.5 mg tablet    Other Relevant Orders    Comprehensive Metabolic Panel    Hemoglobin A1C    Lipid Panel    Albumin , Urine Random    Vitamin D 25-Hydroxy,Total    Vitamin B12    Follow Up In [facility] - PCP - Established    Cocaine abuse in remission (CMS/[facility])    Current Assessment & Plan     Has been 8 months in remission receiving substance abuse counseling         Substance use disorder    Current Assessment & Plan     Currently in remission at this time avoidance of opioids and scheduled medications related to risk         Hypertensive cardiomyopathy, with heart failure (CMS/[facility]) - Primary    Current Assessment & Plan     Well compensated refilled hydrochlorothiazide spironolactone no signs or symptoms of congestive heart failure at this time         Relevant Medications    spironolacton-hydrochlorothiaz (Aldactazide) 25-25 mg tablet    ARIPiprazole (Abilify) 5 mg tablet    benztropine (Cogentin) 0.5 mg tablet    Other Relevant Orders    Comprehensive Metabolic  Panel    Hemoglobin A1C    Lipid Panel    Albumin , Urine Random    Vitamin D 25-Hydroxy,Total    Vitamin B12    Follow Up In [facility] - PCP - Established     Other Visit Diagnoses       Cardiomyopathy, dilated, nonischemic (CMS/HCC)        Relevant Medications    ARIPiprazole (Abilify) 5 mg tablet    benztropine (Cogentin) 0.5 mg tablet    Other Relevant Orders    Comprehensive Metabolic Panel    Hemoglobin A1C    Lipid Panel    Albumin , Urine Random    Vitamin D 25-Hydroxy,Total    Vitamin B12    Follow Up In [facility] - PCP - Established    Chronic systolic congestive heart failure (CMS/[facility])        Relevant Medications    ARIPiprazole (Abilify) 5 mg tablet    benztropine (Cogentin) 0.5 mg tablet    Other Relevant Orders    Comprehensive Metabolic Panel    Hemoglobin A1C    Lipid Panel    Albumin , Urine Random    Vitamin D 25-Hydroxy,Total    Vitamin B12    Follow Up In [facility] - PCP - Established    Smoking addiction        Relevant Medications    nicotine (Nicoderm CQ) 21 mg/24 hr patch    IGT (impaired glucose tolerance)        Relevant Orders    Hemoglobin A1C

## 2023-08-14 NOTE — PROGRESS NOTES
"Subjective   Patient ID: Danielle Dickinson is a 63 y.o. female who presents for Follow-up.    HPI     Review of Systems    Objective   /80 (BP Location: Right arm, Patient Position: Sitting)   Pulse 66   Ht 1.626 m (5' 4\")   Wt 72.6 kg (160 lb)   BMI 27.46 kg/m²     Physical Exam    Assessment/Plan          "

## 2023-08-14 NOTE — ASSESSMENT & PLAN NOTE
Currently in remission at this time avoidance of opioids and scheduled medications related to risk

## 2023-09-05 DIAGNOSIS — F31.9 BIPOLAR 1 DISORDER (MULTI): ICD-10-CM

## 2023-09-05 DIAGNOSIS — J43.2 CENTRILOBULAR EMPHYSEMA (MULTI): ICD-10-CM

## 2023-09-05 DIAGNOSIS — J84.82 ADULT PULMONARY LANGERHANS CELL HISTIOCYTOSIS (MULTI): ICD-10-CM

## 2023-09-05 DIAGNOSIS — M85.852 OSTEOPENIA OF BOTH HIPS: ICD-10-CM

## 2023-09-05 DIAGNOSIS — I11.0 HYPERTENSIVE CARDIOMYOPATHY, WITH HEART FAILURE (MULTI): ICD-10-CM

## 2023-09-05 DIAGNOSIS — I50.22 CHRONIC SYSTOLIC CONGESTIVE HEART FAILURE (MULTI): ICD-10-CM

## 2023-09-05 DIAGNOSIS — I43 HYPERTENSIVE CARDIOMYOPATHY, WITH HEART FAILURE (MULTI): ICD-10-CM

## 2023-09-05 DIAGNOSIS — M85.851 OSTEOPENIA OF BOTH HIPS: ICD-10-CM

## 2023-09-05 DIAGNOSIS — I42.0 CARDIOMYOPATHY, DILATED, NONISCHEMIC (MULTI): ICD-10-CM

## 2023-09-05 RX ORDER — ARIPIPRAZOLE 5 MG/1
5 TABLET ORAL NIGHTLY
Qty: 30 TABLET | Refills: 6 | Status: SHIPPED | OUTPATIENT
Start: 2023-09-05 | End: 2024-03-20 | Stop reason: SDUPTHER

## 2023-09-29 VITALS — BODY MASS INDEX: 26.45 KG/M2 | WEIGHT: 158.73 LBS | HEIGHT: 65 IN

## 2023-09-30 NOTE — H&P
History & Physical Reviewed:   I have reviewed the History and Physical dated:  13-Jul-2023   History and Physical reviewed and relevant findings noted. Patient examined to review pertinent physical  findings.: No significant changes   Home Medications Reviewed: no changes noted   Allergies Reviewed: no changes noted       ERAS (Enhanced Recovery After Surgery):  ·  ERAS Patient: yes   ·  CPM/PAT Utilization: yes   ·  Immunonutrition Recovery Drink Utilization: no   ·  Carbohydrate Supplement Drink Utilization: no     Consent:   COVID-19 Consent:  ·  COVID-19 Risk Consent Surgeon has reviewed key risks related to the risk of usman COVID-19 and if they contract COVID-19 what the risks are.       Electronic Signatures:  Liban Ashton)  (Signed 08-Aug-2023 07:25)   Authored: History & Physical Reviewed, ERAS, Consent,  Note Completion      Last Updated: 08-Aug-2023 07:25 by Liban Ashton)

## 2023-10-01 NOTE — OP NOTE
PROCEDURE DETAILS    Preoperative Diagnosis:  Postmenopause bleeding, N95.0    Postoperative Diagnosis:  Postmenopause bleeding, N95.0    Surgeon: Liban Ashton  Resident/Fellow/Other Assistant: None of these were associated with this case    Procedure:  1.  Hysteroscopy dilation and curettage    Anesthesia: No anesthesiologist associated with this case  Estimated Blood Loss: None  Findings: Uterus sounded to 7 cm, both ostia visualized, atrophic scarred endometrium  Specimens(s) Collected: yes,  Endometrial curettings   Urine Output: 30 mL        Operative Report:   The patient was brought to the operating room. A huddle was performed. The patient was placed under general anesthesia. She was placed in the lithotomy position and prepped and draped  in the usual fashion for a vaginal procedure. A timeout was performed. A Weighted speculum was inserted in the vagina. The  cervix was grasped with a toothed tenaculum. This uterus was sounded to 7 cm. and progressively dilated. A hysteroscopy was performed  using saline as the distention media, both tubal ostia were visualized, the endometrium appeared atrophic and scarred.  No lesions noted.  The hysteroscope was removed. Endometrial curettings were obtained. All instruments were removed. Estimated blood  loss was 0 ml. All counts were correct at the end of the procedure. The patient was taken to the recovery room in stable condition.                        Attestation:   Note Completion:  Attending Attestation I performed the procedure without a resident         Electronic Signatures:  Liban Ashton)  (Signed 08-Aug-2023 07:59)   Authored: Post-Operative Note, Chart Review, Note Completion      Last Updated: 08-Aug-2023 07:59 by Liban Ashton)

## 2023-11-08 ENCOUNTER — TELEPHONE (OUTPATIENT)
Dept: OBSTETRICS AND GYNECOLOGY | Facility: CLINIC | Age: 63
End: 2023-11-08
Payer: COMMERCIAL

## 2023-11-08 ENCOUNTER — OFFICE VISIT (OUTPATIENT)
Dept: OBSTETRICS AND GYNECOLOGY | Facility: CLINIC | Age: 63
End: 2023-11-08
Payer: COMMERCIAL

## 2023-11-08 ENCOUNTER — PREP FOR PROCEDURE (OUTPATIENT)
Dept: OBSTETRICS AND GYNECOLOGY | Facility: CLINIC | Age: 63
End: 2023-11-08

## 2023-11-08 VITALS
HEIGHT: 64 IN | BODY MASS INDEX: 26.63 KG/M2 | DIASTOLIC BLOOD PRESSURE: 92 MMHG | WEIGHT: 156 LBS | SYSTOLIC BLOOD PRESSURE: 160 MMHG

## 2023-11-08 DIAGNOSIS — N95.0 POSTMENOPAUSAL BLEEDING: Primary | ICD-10-CM

## 2023-11-08 DIAGNOSIS — N83.201 CYST OF RIGHT OVARY: ICD-10-CM

## 2023-11-08 LAB
BILIRUBIN, POC: NEGATIVE
BLOOD URINE, POC: POSITIVE
CLARITY, POC: CLEAR
COLOR, POC: YELLOW
GLUCOSE URINE, POC: NEGATIVE
KETONES, POC: NEGATIVE
LEUKOCYTE EST, POC: NEGATIVE
NITRITE, POC: NEGATIVE
PH, POC: 8
POC APPEARANCE OF BODY FLUID: CLEAR
SPECIFIC GRAVITY, POC: 1.01
URINE PROTEIN, POC: NEGATIVE
UROBILINOGEN, POC: 0.2

## 2023-11-08 PROCEDURE — 4004F PT TOBACCO SCREEN RCVD TLK: CPT | Performed by: OBSTETRICS & GYNECOLOGY

## 2023-11-08 PROCEDURE — 99214 OFFICE O/P EST MOD 30 MIN: CPT | Performed by: OBSTETRICS & GYNECOLOGY

## 2023-11-08 PROCEDURE — 81002 URINALYSIS NONAUTO W/O SCOPE: CPT | Performed by: OBSTETRICS & GYNECOLOGY

## 2023-11-08 RX ORDER — GABAPENTIN 600 MG/1
600 TABLET ORAL ONCE
Status: CANCELLED | OUTPATIENT
Start: 2023-11-08 | End: 2023-11-08

## 2023-11-08 RX ORDER — ACETAMINOPHEN 325 MG/1
975 TABLET ORAL ONCE
Status: CANCELLED | OUTPATIENT
Start: 2023-11-08 | End: 2023-11-08

## 2023-11-08 NOTE — TELEPHONE ENCOUNTER
Patient had D&C with Dr. Ashton within the last year, she was told to call in if she had any bleeding, she does now, please advise when she should be seen?

## 2023-11-08 NOTE — TELEPHONE ENCOUNTER
Spoke with pt. She had D&C for PMB on 8/8/23 with normal pathology. She hasn't had any further bleeding up until yesterday when she suddenly had heavy bleeding like a period for a short time and ever since then bleeding slowed and has just been spotting. No pelvic pain but did have some cramping with initial bleeding episode. No recent intercourse. Pt reports urinary frequency for a few days, no dysuria. Will review with Dr Ashton and notify patient of his recommendations

## 2023-11-08 NOTE — ASSESSMENT & PLAN NOTE
-- PMB: Ultrasound on 2/10/2021, and 5/19/2023 both revealing uterine fibroid with endometrium obscured and not well visualized.  Patient with a D&C with hysteroscopy on 8/8/2023 with hysteroscopically atrophic scarred endometrium and normal pathology.  Patient with recurrent PMB.  Discussed possible but unlikely uterine pathology such as uterine cancer.  Discussed with the patient options of continued observation, repeating D&C, or proceeding with a hysterectomy.  With the ovarian cyst and persistent recurrent bleeding, patient desires to proceed with a hysterectomy.  --OVARIAN CYST: Simple cyst of the right ovary was 3.3 cm on ultrasound in 2021, and repeat ultrasound in May 2023 cyst was 4.4 cm.  --Normal Pap and HPV in 2020, in 2023  --Negative Cologuard in February 2023  --Normal mammogram in February 2023  --Patient is P1  -- Patient scheduled for a knee replacement in February 14, 2024    ULTRASOUND (5/19/2023): Uterus 10.0 x 6.5 x 7.8 cm with multiple fibroids largest measuring 5.2 cm, endometrial stripe not well visualized, right ovary 4.1 x 2.9 x 4.1 cm, with a simple 4.4 x 2.8 x 2.9 cm cyst, and left ovary 3.5 x 2.0 x 2.4 cm    Discussed with the patient a Total Laparscopic Hysterectomy with bilateral salpingo oophorectomy.  Discussed the indication, the procedure, the pre and post-op course, and alternatives.  Discussed removal of the uterus and cervix.  Discussed potential complications including risks of bleeding, VTE, injury to the bowel bladder or blood vessels.  Discussed risks of a potential blood transfusion.  Also possible need for a laparotomy with a prolonged hospital course.   Patient understands and all questions answered.    PLAN:  Surgical and Medicaid consent signed for [facility] with BSO  Will schedule surgery  Otherwise follow-up for recurrent bleeding

## 2023-11-08 NOTE — PROGRESS NOTES
Subjective   Patient ID: Danielle Dickinson is a 63 y.o. female who presents for Post Menopausal Bleeding (PMB and Urine frequency.).  HPI  63-year-old here with recurrent PMB.  Patient with prior history irregular bleeding and had a D&C with hysteroscopy performed 8/8/2023 revealing atrophic endometrium on hysteroscope, and normal pathology.  Ultrasound revealed a mildly enlarged uterus with multiple fibroids the largest being 5.2 cm.  The endometrial stripe was not well visualized.  Patient began bleeding again yesterday and continues bleeding today.  Patient denies any pain.  Patient is on no blood thinners.  Patient does have history of a persistent ovarian cyst which in 2021 measured 3.3 cm, and in 2023 measured 4.4 cm.    PSH: Status post TL  No allergies        Objective   Physical Exam  Exam conducted with a chaperone present.   Constitutional:       Appearance: Normal appearance.   Cardiovascular:      Rate and Rhythm: Normal rate and regular rhythm.   Pulmonary:      Effort: Pulmonary effort is normal.      Breath sounds: Normal breath sounds.   Chest:   Breasts:     Right: No mass or tenderness.      Left: No mass or tenderness.   Abdominal:      Palpations: Abdomen is soft. There is no mass.      Tenderness: There is no abdominal tenderness.   Genitourinary:     General: Normal vulva.      Vagina: Normal. No lesions.      Cervix: No lesion.      Uterus: Normal. Not enlarged and not tender.       Adnexa: Right adnexa normal and left adnexa normal.        Right: No mass or tenderness.          Left: No mass or tenderness.        Comments: Positive dark blood in the vaginal vault  Musculoskeletal:      Cervical back: Neck supple.   Skin:     General: Skin is warm and dry.   Neurological:      Mental Status: She is alert and oriented to person, place, and time.   Psychiatric:         Mood and Affect: Mood normal.         Behavior: Behavior normal.         Assessment/Plan   Problem List Items Addressed This Visit              ICD-10-CM    Postmenopausal bleeding - Primary N95.0     -- PMB: Ultrasound on 2/10/2021, and 5/19/2023 both revealing uterine fibroid with endometrium obscured and not well visualized.  Patient with a D&C with hysteroscopy on 8/8/2023 with hysteroscopically atrophic scarred endometrium and normal pathology.  Patient with recurrent PMB.  Discussed possible but unlikely uterine pathology such as uterine cancer.  Discussed with the patient options of continued observation, repeating D&C, or proceeding with a hysterectomy.  With the ovarian cyst and persistent recurrent bleeding, patient desires to proceed with a hysterectomy.  --OVARIAN CYST: Simple cyst of the right ovary was 3.3 cm on ultrasound in 2021, and repeat ultrasound in May 2023 cyst was 4.4 cm.  --Normal Pap and HPV in 2020, in 2023  --Negative Cologuard in February 2023  --Normal mammogram in February 2023  --Patient is P1  -- Patient scheduled for a knee replacement in February 14, 2024    ULTRASOUND (5/19/2023): Uterus 10.0 x 6.5 x 7.8 cm with multiple fibroids largest measuring 5.2 cm, endometrial stripe not well visualized, right ovary 4.1 x 2.9 x 4.1 cm, with a simple 4.4 x 2.8 x 2.9 cm cyst, and left ovary 3.5 x 2.0 x 2.4 cm    Discussed with the patient a Total Laparscopic Hysterectomy with bilateral salpingo oophorectomy.  Discussed the indication, the procedure, the pre and post-op course, and alternatives.  Discussed removal of the uterus and cervix.  Discussed potential complications including risks of bleeding, VTE, injury to the bowel bladder or blood vessels.  Discussed risks of a potential blood transfusion.  Also possible need for a laparotomy with a prolonged hospital course.   Patient understands and all questions answered.    PLAN:  Surgical and Medicaid consent signed for [facility] with [facility]  Will schedule surgery  Otherwise follow-up for recurrent bleeding         Relevant Orders    POCT urinalysis dipstick manually resulted  (Completed)    Cyst of right ovary N83.201

## 2023-11-15 DIAGNOSIS — Z23 FLU VACCINE NEED: ICD-10-CM

## 2023-11-16 ENCOUNTER — LAB (OUTPATIENT)
Dept: LAB | Facility: LAB | Age: 63
End: 2023-11-16
Payer: COMMERCIAL

## 2023-11-16 DIAGNOSIS — M85.851 OSTEOPENIA OF BOTH HIPS: ICD-10-CM

## 2023-11-16 DIAGNOSIS — J43.2 CENTRILOBULAR EMPHYSEMA (MULTI): ICD-10-CM

## 2023-11-16 DIAGNOSIS — R73.02 IGT (IMPAIRED GLUCOSE TOLERANCE): ICD-10-CM

## 2023-11-16 DIAGNOSIS — F31.9 BIPOLAR 1 DISORDER (MULTI): ICD-10-CM

## 2023-11-16 DIAGNOSIS — I43 HYPERTENSIVE CARDIOMYOPATHY, WITH HEART FAILURE (MULTI): ICD-10-CM

## 2023-11-16 DIAGNOSIS — J84.82 ADULT PULMONARY LANGERHANS CELL HISTIOCYTOSIS (MULTI): ICD-10-CM

## 2023-11-16 DIAGNOSIS — M85.852 OSTEOPENIA OF BOTH HIPS: ICD-10-CM

## 2023-11-16 DIAGNOSIS — I11.0 HYPERTENSIVE CARDIOMYOPATHY, WITH HEART FAILURE (MULTI): ICD-10-CM

## 2023-11-16 DIAGNOSIS — I50.22 CHRONIC SYSTOLIC CONGESTIVE HEART FAILURE (MULTI): ICD-10-CM

## 2023-11-16 DIAGNOSIS — I42.0 CARDIOMYOPATHY, DILATED, NONISCHEMIC (MULTI): ICD-10-CM

## 2023-11-16 LAB
25(OH)D3 SERPL-MCNC: 28 NG/ML (ref 30–100)
ALBUMIN SERPL BCP-MCNC: 4.2 G/DL (ref 3.4–5)
ALP SERPL-CCNC: 64 U/L (ref 33–136)
ALT SERPL W P-5'-P-CCNC: 14 U/L (ref 7–45)
ANION GAP SERPL CALC-SCNC: 12 MMOL/L (ref 10–20)
AST SERPL W P-5'-P-CCNC: 13 U/L (ref 9–39)
BILIRUB SERPL-MCNC: 0.4 MG/DL (ref 0–1.2)
BUN SERPL-MCNC: 14 MG/DL (ref 6–23)
CALCIUM SERPL-MCNC: 8.9 MG/DL (ref 8.6–10.3)
CHLORIDE SERPL-SCNC: 105 MMOL/L (ref 98–107)
CHOLEST SERPL-MCNC: 271 MG/DL (ref 0–199)
CHOLESTEROL/HDL RATIO: 3.8
CO2 SERPL-SCNC: 27 MMOL/L (ref 21–32)
CREAT SERPL-MCNC: 0.67 MG/DL (ref 0.5–1.05)
CREAT UR-MCNC: 120.3 MG/DL (ref 20–320)
EST. AVERAGE GLUCOSE BLD GHB EST-MCNC: 111 MG/DL
GFR SERPL CREATININE-BSD FRML MDRD: >90 ML/MIN/1.73M*2
GLUCOSE SERPL-MCNC: 95 MG/DL (ref 74–99)
HBA1C MFR BLD: 5.5 %
HDLC SERPL-MCNC: 72 MG/DL
LDLC SERPL CALC-MCNC: 177 MG/DL
MICROALBUMIN UR-MCNC: 8 MG/L
MICROALBUMIN/CREAT UR: 6.7 UG/MG CREAT
NON HDL CHOLESTEROL: 199 MG/DL (ref 0–149)
POTASSIUM SERPL-SCNC: 3.9 MMOL/L (ref 3.5–5.3)
PROT SERPL-MCNC: 6.1 G/DL (ref 6.4–8.2)
SODIUM SERPL-SCNC: 140 MMOL/L (ref 136–145)
TRIGL SERPL-MCNC: 112 MG/DL (ref 0–149)
VIT B12 SERPL-MCNC: 384 PG/ML (ref 211–911)
VLDL: 22 MG/DL (ref 0–40)

## 2023-11-16 PROCEDURE — 82570 ASSAY OF URINE CREATININE: CPT

## 2023-11-16 PROCEDURE — 83036 HEMOGLOBIN GLYCOSYLATED A1C: CPT

## 2023-11-16 PROCEDURE — 82607 VITAMIN B-12: CPT

## 2023-11-16 PROCEDURE — 82306 VITAMIN D 25 HYDROXY: CPT

## 2023-11-16 PROCEDURE — 80053 COMPREHEN METABOLIC PANEL: CPT

## 2023-11-16 PROCEDURE — 36415 COLL VENOUS BLD VENIPUNCTURE: CPT

## 2023-11-16 PROCEDURE — 82043 UR ALBUMIN QUANTITATIVE: CPT

## 2023-11-16 PROCEDURE — 80061 LIPID PANEL: CPT

## 2023-11-17 ENCOUNTER — APPOINTMENT (OUTPATIENT)
Dept: PRIMARY CARE | Facility: CLINIC | Age: 63
End: 2023-11-17
Payer: COMMERCIAL

## 2023-12-12 DIAGNOSIS — I10 ESSENTIAL (PRIMARY) HYPERTENSION: ICD-10-CM

## 2023-12-13 RX ORDER — AMLODIPINE BESYLATE 5 MG/1
5 TABLET ORAL DAILY
Qty: 90 TABLET | Refills: 3 | Status: SHIPPED | OUTPATIENT
Start: 2023-12-13

## 2024-01-29 ENCOUNTER — TELEPHONE (OUTPATIENT)
Dept: PRIMARY CARE | Facility: CLINIC | Age: 64
End: 2024-01-29
Payer: COMMERCIAL

## 2024-01-29 NOTE — TELEPHONE ENCOUNTER
Mayi from the [facility] called asking for surgery clearance form for Danielle. Please fax to [phone] Attention Mayi, she will be having surgery next week.

## 2024-02-06 ENCOUNTER — OFFICE VISIT (OUTPATIENT)
Dept: PRIMARY CARE | Facility: CLINIC | Age: 64
End: 2024-02-06
Payer: COMMERCIAL

## 2024-02-06 VITALS
HEIGHT: 64 IN | DIASTOLIC BLOOD PRESSURE: 85 MMHG | SYSTOLIC BLOOD PRESSURE: 146 MMHG | BODY MASS INDEX: 26.63 KG/M2 | HEART RATE: 74 BPM | WEIGHT: 156 LBS

## 2024-02-06 DIAGNOSIS — F17.200 NEEDS SMOKING CESSATION EDUCATION: ICD-10-CM

## 2024-02-06 DIAGNOSIS — I11.0 HYPERTENSIVE CARDIOMYOPATHY, WITH HEART FAILURE (MULTI): ICD-10-CM

## 2024-02-06 DIAGNOSIS — Z00.00 WELCOME TO MEDICARE PREVENTIVE VISIT: ICD-10-CM

## 2024-02-06 DIAGNOSIS — M17.11 PRIMARY OSTEOARTHRITIS OF RIGHT KNEE: ICD-10-CM

## 2024-02-06 DIAGNOSIS — Z01.818 PREOPERATIVE CLEARANCE: ICD-10-CM

## 2024-02-06 DIAGNOSIS — I43 HYPERTENSIVE CARDIOMYOPATHY, WITH HEART FAILURE (MULTI): ICD-10-CM

## 2024-02-06 DIAGNOSIS — M85.851 OSTEOPENIA OF BOTH HIPS: ICD-10-CM

## 2024-02-06 DIAGNOSIS — Z00.00 ENCOUNTER FOR INITIAL ANNUAL WELLNESS VISIT IN MEDICARE PATIENT: Primary | ICD-10-CM

## 2024-02-06 DIAGNOSIS — E78.2 MIXED HYPERLIPIDEMIA: ICD-10-CM

## 2024-02-06 DIAGNOSIS — I50.22 CHRONIC SYSTOLIC CONGESTIVE HEART FAILURE (MULTI): ICD-10-CM

## 2024-02-06 DIAGNOSIS — F31.9 BIPOLAR 1 DISORDER (MULTI): ICD-10-CM

## 2024-02-06 DIAGNOSIS — H60.8X3 CHRONIC ECZEMATOUS OTITIS EXTERNA OF BOTH EARS: ICD-10-CM

## 2024-02-06 DIAGNOSIS — J43.2 CENTRILOBULAR EMPHYSEMA (MULTI): ICD-10-CM

## 2024-02-06 DIAGNOSIS — J84.82 ADULT PULMONARY LANGERHANS CELL HISTIOCYTOSIS (MULTI): ICD-10-CM

## 2024-02-06 DIAGNOSIS — F17.200 SMOKING ADDICTION: ICD-10-CM

## 2024-02-06 DIAGNOSIS — I42.0 CARDIOMYOPATHY, DILATED, NONISCHEMIC (MULTI): ICD-10-CM

## 2024-02-06 DIAGNOSIS — Z01.818 PREOP EXAMINATION: ICD-10-CM

## 2024-02-06 DIAGNOSIS — F14.11 COCAINE ABUSE IN REMISSION (MULTI): ICD-10-CM

## 2024-02-06 DIAGNOSIS — M85.852 OSTEOPENIA OF BOTH HIPS: ICD-10-CM

## 2024-02-06 PROCEDURE — G0446 INTENS BEHAVE THER CARDIO DX: HCPCS | Performed by: INTERNAL MEDICINE

## 2024-02-06 PROCEDURE — G0442 ANNUAL ALCOHOL SCREEN 15 MIN: HCPCS | Performed by: INTERNAL MEDICINE

## 2024-02-06 PROCEDURE — 99497 ADVNCD CARE PLAN 30 MIN: CPT | Performed by: INTERNAL MEDICINE

## 2024-02-06 PROCEDURE — 99214 OFFICE O/P EST MOD 30 MIN: CPT | Performed by: INTERNAL MEDICINE

## 2024-02-06 PROCEDURE — 93000 ELECTROCARDIOGRAM COMPLETE: CPT | Performed by: INTERNAL MEDICINE

## 2024-02-06 PROCEDURE — G0444 DEPRESSION SCREEN ANNUAL: HCPCS | Performed by: INTERNAL MEDICINE

## 2024-02-06 RX ORDER — UMECLIDINIUM BROMIDE AND VILANTEROL TRIFENATATE 62.5; 25 UG/1; UG/1
1 POWDER RESPIRATORY (INHALATION) DAILY
Qty: 30 EACH | Refills: 11 | Status: SHIPPED | OUTPATIENT
Start: 2024-02-06 | End: 2024-05-07 | Stop reason: SDUPTHER

## 2024-02-06 RX ORDER — OXCARBAZEPINE 600 MG/1
600 TABLET, FILM COATED ORAL 2 TIMES DAILY
Qty: 180 TABLET | Refills: 3 | Status: SHIPPED | OUTPATIENT
Start: 2024-02-06 | End: 2025-02-05

## 2024-02-06 RX ORDER — ROSUVASTATIN CALCIUM 10 MG/1
10 TABLET, COATED ORAL NIGHTLY
Qty: 90 TABLET | Refills: 3 | Status: SHIPPED | OUTPATIENT
Start: 2024-02-06 | End: 2025-02-05

## 2024-02-06 ASSESSMENT — ACTIVITIES OF DAILY LIVING (ADL)
TAKING_MEDICATION: INDEPENDENT
DOING_HOUSEWORK: INDEPENDENT
DRESSING: INDEPENDENT
BATHING: INDEPENDENT
GROCERY_SHOPPING: INDEPENDENT
MANAGING_FINANCES: INDEPENDENT

## 2024-02-06 ASSESSMENT — ENCOUNTER SYMPTOMS
OCCASIONAL FEELINGS OF UNSTEADINESS: 0
DEPRESSION: 0
LOSS OF SENSATION IN FEET: 0

## 2024-02-06 ASSESSMENT — ANXIETY QUESTIONNAIRES
IF YOU CHECKED OFF ANY PROBLEMS ON THIS QUESTIONNAIRE, HOW DIFFICULT HAVE THESE PROBLEMS MADE IT FOR YOU TO DO YOUR WORK, TAKE CARE OF THINGS AT HOME, OR GET ALONG WITH OTHER PEOPLE: NOT DIFFICULT AT ALL
GAD7 TOTAL SCORE: 1
3. WORRYING TOO MUCH ABOUT DIFFERENT THINGS: NOT AT ALL
7. FEELING AFRAID AS IF SOMETHING AWFUL MIGHT HAPPEN: NOT AT ALL
4. TROUBLE RELAXING: NOT AT ALL
5. BEING SO RESTLESS THAT IT IS HARD TO SIT STILL: NOT AT ALL
6. BECOMING EASILY ANNOYED OR IRRITABLE: NOT AT ALL
1. FEELING NERVOUS, ANXIOUS, OR ON EDGE: SEVERAL DAYS
2. NOT BEING ABLE TO STOP OR CONTROL WORRYING: NOT AT ALL

## 2024-02-06 ASSESSMENT — PATIENT HEALTH QUESTIONNAIRE - PHQ9
1. LITTLE INTEREST OR PLEASURE IN DOING THINGS: NOT AT ALL
SUM OF ALL RESPONSES TO PHQ9 QUESTIONS 1 AND 2: 0
2. FEELING DOWN, DEPRESSED OR HOPELESS: NOT AT ALL

## 2024-02-06 NOTE — PROGRESS NOTES
Elevated normal EKG from cardiology  Danielle Dickinson pending all EKG from cardiology on team    She has had once again way before I noted as LV strain here is a Cathy is brought back for a CTA of the event reported complete right bundle with transient fascicular block Subjective   Danielle Dickinson is a 64 y.o. female who presents to the office today for a preoperative consultation at the request of surgeon Dr. MORIAH Cárdenas [facility] who plans on performing right total knee arthroplasty on February 12. This consultation is requested for the specific conditions prompting preoperative evaluation (i.e. because of potential effect on operative risk): Hypertension congestive heart failure. Planned anesthesia: spinal. The patient has the following known anesthesia issues:  None . Patients bleeding risk: no recent abnormal bleeding. Patient does not have objections to receiving blood products if needed.    Review of Systems   All other systems reviewed and are negative.      Objective   Physical Exam  Vitals and nursing note reviewed.   Constitutional:       General: She is not in acute distress.     Appearance: Normal appearance. She is well-developed. She is not toxic-appearing.   HENT:      Head: Normocephalic and atraumatic.      Right Ear: Tympanic membrane and external ear normal.      Left Ear: Tympanic membrane and external ear normal.      Nose: Nose normal.      Mouth/Throat:      Mouth: Mucous membranes are moist.      Pharynx: Oropharynx is clear. No oropharyngeal exudate or posterior oropharyngeal erythema.      Tonsils: No tonsillar exudate. 2+ on the right. 2+ on the left.   Eyes:      Extraocular Movements: Extraocular movements intact.      Conjunctiva/sclera: Conjunctivae normal.   Cardiovascular:      Rate and Rhythm: Normal rate and regular rhythm.      Pulses: Normal pulses.      Heart sounds: Normal heart sounds. No murmur heard.  Pulmonary:      Effort: Pulmonary effort is normal.      Breath sounds:  Normal breath sounds.   Abdominal:      General: Abdomen is flat. Bowel sounds are normal.      Palpations: Abdomen is soft.   Musculoskeletal:      Cervical back: Neck supple.   Lymphadenopathy:      Cervical: No cervical adenopathy.   Skin:     General: Skin is warm and dry.      Findings: No rash.   Neurological:      Mental Status: She is alert. Mental status is at baseline.   Psychiatric:         Mood and Affect: Mood normal.         Behavior: Behavior normal.         Thought Content: Thought content normal.         Judgment: Judgment normal.          Predictors of intubation difficulty:   Morbid obesity? no   Anatomically abnormal facies? no   Prominent incisors? no   Receding mandible? no   Short, thick neck? no   Neck range of motion: normal   Mallampati score: I (soft palate, uvula, fauces, and tonsillar pillars visible)   Thyromental distance: < 6cm   Mouth openincm   Dentition: No chipped, loose, or missing teeth.    Cardiographics  ECG: no change since previous ECG dated 2018  Echocardiogram: not done    Imaging  Chest x-ray: air trapping/emphysema     Lab Review   No visits with results within 2 Month(s) from this visit.   Latest known visit with results is:   Lab on 2023   Component Date Value    Glucose 2023 95     Sodium 2023 140     Potassium 2023 3.9     Chloride 2023 105     Bicarbonate 2023 27     Anion Gap 2023 12     Urea Nitrogen 2023 14     Creatinine 2023 0.67     eGFR 2023 >90     Calcium 2023 8.9     Albumin 2023 4.2     Alkaline Phosphatase 2023 64     Total Protein 2023 6.1 (L)     AST 2023 13     Bilirubin, Total 2023 0.4     ALT 2023 14     Hemoglobin A1C 2023 5.5     Estimated Average Glucose 2023 111     Cholesterol 2023 271 (H)     HDL-Cholesterol 2023 72.0     Cholesterol/HDL Ratio 2023 3.8     LDL Calculated 2023 177 (H)     VLDL  11/16/2023 22     Triglycerides 11/16/2023 112     Non HDL Cholesterol 11/16/2023 199 (H)     Albumin, Urine Random 11/16/2023 8.0     Creatinine, Urine Random 11/16/2023 120.3     Albumin/Creatine Ratio 11/16/2023 6.7     Vitamin D, 25-Hydroxy, T* 11/16/2023 28 (L)     Vitamin B12 11/16/2023 384    .    Assessment/Plan   64 y.o. female with planned surgery as above.    Known risk factors for perioperative complications: None    Difficulty with intubation is not anticipated.    Cardiac Risk Estimation: low    Current medications which may produce withdrawal symptoms if withheld perioperatively: Continue carvedilol and amlodipine morning of surgery    1. Preoperative workup as follows none.  2. Change in medication regimen before surgery: none, continue medication regimen including morning of surgery, with sip of water.  3. Prophylaxis for cardiac events with perioperative beta-blockers:  Continue carvedilol 25 mg twice a day .  4. Invasive hemodynamic monitoring perioperatively: not indicated.  5. Deep vein thrombosis prophylaxis postoperatively:regimen to be chosen by surgical team.  6. Surveillance for postoperative MI with ECG immediately postoperatively and on postoperative days 1 and 2 AND troponin levels 24 hours postoperatively and on day 4 or hospital discharge (whichever comes first): not indicated.  7. Other measures:  Hold losartan and spironolactone and hydrochlorothiazide day of surgery okay to resume after surgery continue psychiatric meds as tolerated including oxcarbazepine and Abilify DVT prophylaxis consider low molecular weight heparin versus aspirin versus apixaban to reduce risk of blood clot

## 2024-02-06 NOTE — PROGRESS NOTES
"Subjective   Reason for Visit: Danielle Dickinson is an 64 y.o. female here for a Medicare Wellness visit.   Preopexam             HPI    Patient Care Team:  Nelson Leavitt DO as PCP - General  Nelson Leavitt DO as PCP - [employer] PCP     Review of Systems    Objective   Vitals:  /85 (BP Location: Right arm, Patient Position: Sitting)   Pulse 74   Ht 1.626 m (5' 4\")   Wt 70.8 kg (156 lb)   BMI 26.78 kg/m²       Physical Exam    Assessment/Plan   Problem List Items Addressed This Visit       Adult pulmonary Langerhans cell histiocytosis (CMS/HCC)    Bipolar 1 disorder (CMS/HCC)    Centrilobular emphysema (CMS/HCC)    Primary osteoarthritis of right knee    Encounter for initial annual wellness visit in Medicare patient - Primary    Cocaine abuse in remission (CMS/HCC)    Hypertensive cardiomyopathy, with heart failure (CMS/HCC)    Preoperative clearance    Relevant Orders    ECG 12 lead (Clinic Performed)     Other Visit Diagnoses       Preop examination                   "

## 2024-02-06 NOTE — ASSESSMENT & PLAN NOTE
Continue to follow with psychiatric specialist continue Abilify and oxcarbazepine for mood stabilization stable at this time

## 2024-02-06 NOTE — PROGRESS NOTES
Alcohol consumption becomes hazardous when consuming women oh over 65 years old greater than 7 drinks per week or greater than 3 drinks per occasion for men greater than 14 drinks per week or greater than 4 drinks per occasion.  I spent 15 minutes screening for alcohol use.Depression and anxiety screening completed   PHQ9 score   GAD7 score   I spent 15 minutes obtaining and discussing depression screening using PHQ 2 questions with results documented in chart.  Screening using PHQ-9 and SOFIA-7 scores were used for follow-up with treatment and referral plan discussed.      I spent more than 3 minutes but less than 10 minutes counseling patient about need for smoking/tobacco cessation and how I can get support efforts when patient is ready to quit.  Discussed nicotine replacement therapy such as bupropion nicotine replacement therapy and Chantix .  Hypnosis,support groups,and acupuncture are other potential options.I spent 15 minutes face-to-face with this individual discussing the cardiovascular risk and behavioral therapies of nutritional choices exercise and elimination of habits contributing to risk .We agreed on a plan how they may be able to reduce  current cardiovascular risk.  Per patient with calculation greater than 10% aspirin use was discussed and encouraged unless known allergy or increased risk of bleeding contraindicates use patient's 10-year CV risk estimate calculates:I spent greater than 15 minutes discussing advance care planning including the explanation and discussion of advanced directives.  If patient does not have current up-to-date documents examples and information provided on how to create both living will and power of .  toolkit was given to patient and was encouraged to work out completing these documents.Subjective   Reason for Visit: Danielle Dickinson is an 64 y.o. female here for a Medicare Wellness visit.               HPI    Patient Care Team:  Nelson Leavitt DO as PCP -  "General Nelson Leavitt, DO as PCP - [employer] PCP     Review of Systems   All other systems reviewed and are negative.      Objective   Vitals:  /85 (BP Location: Right arm, Patient Position: Sitting)   Pulse 74   Ht 1.626 m (5' 4\")   Wt 70.8 kg (156 lb)   BMI 26.78 kg/m²       Physical Exam    Assessment/Plan   Problem List Items Addressed This Visit       Adult pulmonary Langerhans cell histiocytosis (CMS/HCC)    Bipolar 1 disorder (CMS/HCC)    Centrilobular emphysema (CMS/HCC)    Primary osteoarthritis of right knee    Encounter for initial annual wellness visit in Medicare patient - Primary    Cocaine abuse in remission (CMS/HCC)    Hypertensive cardiomyopathy, with heart failure (CMS/HCC)    Preoperative clearance    Relevant Orders    ECG 12 lead (Clinic Performed) (Completed)     Other Visit Diagnoses       Preop examination                   "

## 2024-02-15 ENCOUNTER — TELEPHONE (OUTPATIENT)
Dept: PRIMARY CARE | Facility: CLINIC | Age: 64
End: 2024-02-15
Payer: COMMERCIAL

## 2024-02-15 NOTE — TELEPHONE ENCOUNTER
Danielle is asking if she should continue taking the spironolactone or not, since her surgery Monday.  Please advise

## 2024-03-19 DIAGNOSIS — Z00.00 WELCOME TO MEDICARE PREVENTIVE VISIT: ICD-10-CM

## 2024-03-19 DIAGNOSIS — M85.851 OSTEOPENIA OF BOTH HIPS: ICD-10-CM

## 2024-03-19 DIAGNOSIS — J84.82 ADULT PULMONARY LANGERHANS CELL HISTIOCYTOSIS (MULTI): ICD-10-CM

## 2024-03-19 DIAGNOSIS — H60.8X3 CHRONIC ECZEMATOUS OTITIS EXTERNA OF BOTH EARS: ICD-10-CM

## 2024-03-19 DIAGNOSIS — I50.22 CHRONIC SYSTOLIC CONGESTIVE HEART FAILURE (MULTI): ICD-10-CM

## 2024-03-19 DIAGNOSIS — I42.0 CARDIOMYOPATHY, DILATED, NONISCHEMIC (MULTI): ICD-10-CM

## 2024-03-19 DIAGNOSIS — J43.2 CENTRILOBULAR EMPHYSEMA (MULTI): ICD-10-CM

## 2024-03-19 DIAGNOSIS — E78.2 MIXED HYPERLIPIDEMIA: ICD-10-CM

## 2024-03-19 DIAGNOSIS — F17.200 SMOKING ADDICTION: ICD-10-CM

## 2024-03-19 DIAGNOSIS — M85.852 OSTEOPENIA OF BOTH HIPS: ICD-10-CM

## 2024-03-19 DIAGNOSIS — F17.200 NEEDS SMOKING CESSATION EDUCATION: ICD-10-CM

## 2024-03-19 RX ORDER — LOSARTAN POTASSIUM 100 MG/1
TABLET ORAL
Qty: 90 TABLET | Refills: 1 | Status: SHIPPED | OUTPATIENT
Start: 2024-03-19

## 2024-03-20 DIAGNOSIS — I43 HYPERTENSIVE CARDIOMYOPATHY, WITH HEART FAILURE (MULTI): ICD-10-CM

## 2024-03-20 DIAGNOSIS — I11.0 HYPERTENSIVE CARDIOMYOPATHY, WITH HEART FAILURE (MULTI): ICD-10-CM

## 2024-03-20 DIAGNOSIS — I50.22 CHRONIC SYSTOLIC CONGESTIVE HEART FAILURE (MULTI): ICD-10-CM

## 2024-03-20 DIAGNOSIS — M85.852 OSTEOPENIA OF BOTH HIPS: ICD-10-CM

## 2024-03-20 DIAGNOSIS — J43.2 CENTRILOBULAR EMPHYSEMA (MULTI): ICD-10-CM

## 2024-03-20 DIAGNOSIS — M85.851 OSTEOPENIA OF BOTH HIPS: ICD-10-CM

## 2024-03-20 DIAGNOSIS — I42.0 CARDIOMYOPATHY, DILATED, NONISCHEMIC (MULTI): ICD-10-CM

## 2024-03-20 DIAGNOSIS — F31.9 BIPOLAR 1 DISORDER (MULTI): ICD-10-CM

## 2024-03-20 DIAGNOSIS — J84.82 ADULT PULMONARY LANGERHANS CELL HISTIOCYTOSIS (MULTI): ICD-10-CM

## 2024-03-20 RX ORDER — BENZTROPINE MESYLATE 0.5 MG/1
0.5 TABLET ORAL NIGHTLY
Qty: 90 TABLET | Refills: 3 | Status: SHIPPED | OUTPATIENT
Start: 2024-03-20

## 2024-03-20 RX ORDER — ARIPIPRAZOLE 5 MG/1
5 TABLET ORAL NIGHTLY
Qty: 90 TABLET | Refills: 3 | Status: SHIPPED | OUTPATIENT
Start: 2024-03-20

## 2024-05-07 ENCOUNTER — OFFICE VISIT (OUTPATIENT)
Dept: PRIMARY CARE | Facility: CLINIC | Age: 64
End: 2024-05-07
Payer: COMMERCIAL

## 2024-05-07 VITALS
DIASTOLIC BLOOD PRESSURE: 70 MMHG | WEIGHT: 150 LBS | HEART RATE: 55 BPM | HEIGHT: 64 IN | BODY MASS INDEX: 25.61 KG/M2 | SYSTOLIC BLOOD PRESSURE: 110 MMHG

## 2024-05-07 DIAGNOSIS — J43.2 CENTRILOBULAR EMPHYSEMA (MULTI): ICD-10-CM

## 2024-05-07 DIAGNOSIS — F31.9 BIPOLAR 1 DISORDER (MULTI): ICD-10-CM

## 2024-05-07 DIAGNOSIS — I43 HYPERTENSIVE CARDIOMYOPATHY, WITH HEART FAILURE (MULTI): Primary | ICD-10-CM

## 2024-05-07 DIAGNOSIS — B19.20 COMPENSATED CIRRHOSIS RELATED TO HEPATITIS C VIRUS (HCV) (MULTI): ICD-10-CM

## 2024-05-07 DIAGNOSIS — F14.11 COCAINE ABUSE IN REMISSION (MULTI): ICD-10-CM

## 2024-05-07 DIAGNOSIS — Z86.19 HISTORY OF POSITIVE HEPATITIS C: ICD-10-CM

## 2024-05-07 DIAGNOSIS — I42.0 CARDIOMYOPATHY, DILATED, NONISCHEMIC (MULTI): ICD-10-CM

## 2024-05-07 DIAGNOSIS — Z12.31 SCREENING MAMMOGRAM FOR BREAST CANCER: ICD-10-CM

## 2024-05-07 DIAGNOSIS — Z01.818 PREOPERATIVE CLEARANCE: ICD-10-CM

## 2024-05-07 DIAGNOSIS — F17.200 NEEDS SMOKING CESSATION EDUCATION: ICD-10-CM

## 2024-05-07 DIAGNOSIS — J84.82 ADULT PULMONARY LANGERHANS CELL HISTIOCYTOSIS (MULTI): ICD-10-CM

## 2024-05-07 DIAGNOSIS — M85.851 OSTEOPENIA OF BOTH HIPS: ICD-10-CM

## 2024-05-07 DIAGNOSIS — I11.0 HYPERTENSIVE CARDIOMYOPATHY, WITH HEART FAILURE (MULTI): Primary | ICD-10-CM

## 2024-05-07 DIAGNOSIS — M85.852 OSTEOPENIA OF BOTH HIPS: ICD-10-CM

## 2024-05-07 DIAGNOSIS — K74.69 COMPENSATED CIRRHOSIS RELATED TO HEPATITIS C VIRUS (HCV) (MULTI): ICD-10-CM

## 2024-05-07 PROCEDURE — 99213 OFFICE O/P EST LOW 20 MIN: CPT | Performed by: INTERNAL MEDICINE

## 2024-05-07 PROCEDURE — 99406 BEHAV CHNG SMOKING 3-10 MIN: CPT | Performed by: INTERNAL MEDICINE

## 2024-05-07 RX ORDER — UMECLIDINIUM BROMIDE AND VILANTEROL TRIFENATATE 62.5; 25 UG/1; UG/1
1 POWDER RESPIRATORY (INHALATION) DAILY
Qty: 30 EACH | Refills: 11 | Status: SHIPPED | OUTPATIENT
Start: 2024-05-07 | End: 2024-06-06

## 2024-05-07 ASSESSMENT — ENCOUNTER SYMPTOMS
OCCASIONAL FEELINGS OF UNSTEADINESS: 0
LOSS OF SENSATION IN FEET: 0
DEPRESSION: 0

## 2024-05-07 NOTE — ASSESSMENT & PLAN NOTE
Continue to urged him educate patient on importance of smoking cessation down to less than 5 cigarettes a day working well

## 2024-05-07 NOTE — ASSESSMENT & PLAN NOTE
Well compensated at this time continue carvedilol losartan spironolactone hydrochlorothiazide and amlodipine

## 2024-05-07 NOTE — PROGRESS NOTES
"Subjective   Patient ID: Danielle Dickinson is a 64 y.o. female who presents for Follow-up.    HPI     Review of Systems    Objective   /70 (BP Location: Left arm, Patient Position: Sitting)   Pulse 55   Ht 1.626 m (5' 4\")   Wt 68 kg (150 lb)   BMI 25.75 kg/m²     Physical Exam    Assessment/Plan          "

## 2024-05-07 NOTE — PROGRESS NOTES
"Subjective   Reason for Visit: Danielle Dickinson is an 64 y.o. female here for postop knee replacementI spent more than 3 minutes but less than 10 minutes counseling patient about need for smoking/tobacco cessation and how I can get support efforts when patient is ready to quit.  Discussed nicotine replacement therapy such as bupropion nicotine replacement therapy and Chantix .  Hypnosis,support groups,and acupuncture are other potential options. visit.     Past Medical, Surgical, and Family History reviewed and updated in chart.    Reviewed all medications by prescribing practitioner or clinical pharmacist (such as prescriptions, OTCs, herbal therapies and supplements) and documented in the medical record.    HPI    Patient Care Team:  Nelson Leavitt DO as PCP - General  Nelson Leavitt DO as PCP - [employer] PCP     Review of Systems   All other systems reviewed and are negative.      Objective   Vitals:  /70 (BP Location: Left arm, Patient Position: Sitting)   Pulse 55   Ht 1.626 m (5' 4\")   Wt 68 kg (150 lb)   BMI 25.75 kg/m²       Physical Exam  Vitals and nursing note reviewed.   Constitutional:       General: She is not in acute distress.     Appearance: Normal appearance. She is well-developed. She is not toxic-appearing.   HENT:      Head: Normocephalic and atraumatic.      Right Ear: Tympanic membrane and external ear normal.      Left Ear: Tympanic membrane and external ear normal.      Nose: Nose normal.      Mouth/Throat:      Mouth: Mucous membranes are moist.      Pharynx: Oropharynx is clear. No oropharyngeal exudate or posterior oropharyngeal erythema.      Tonsils: No tonsillar exudate. 2+ on the right. 2+ on the left.   Eyes:      Extraocular Movements: Extraocular movements intact.      Conjunctiva/sclera: Conjunctivae normal.   Cardiovascular:      Rate and Rhythm: Normal rate and regular rhythm.      Pulses: Normal pulses.      Heart sounds: Normal heart sounds. No " murmur heard.  Pulmonary:      Effort: Pulmonary effort is normal.      Breath sounds: Normal breath sounds.   Abdominal:      General: Abdomen is flat. Bowel sounds are normal.      Palpations: Abdomen is soft.   Musculoskeletal:      Cervical back: Neck supple.   Lymphadenopathy:      Cervical: No cervical adenopathy.   Skin:     General: Skin is warm and dry.      Findings: No rash.   Neurological:      Mental Status: She is alert. Mental status is at baseline.   Psychiatric:         Mood and Affect: Mood normal.         Behavior: Behavior normal.         Thought Content: Thought content normal.         Judgment: Judgment normal.         Assessment/Plan   Problem List Items Addressed This Visit       Adult pulmonary Langerhans cell histiocytosis (Multi)    Current Assessment & Plan     Improvement with working on smoking cessation down to 5 cigarettes a day         Relevant Medications    umeclidinium-vilanteroL (Anoro Ellipta) 62.5-25 mcg/actuation blister with device    Bipolar 1 disorder (Multi)    Current Assessment & Plan     Continues to see psychiatrist and counselor stable on oxcarbazepine 600 mg twice a day with Abilify 5 mg nightly         Centrilobular emphysema (Multi)    Current Assessment & Plan     Stable continue and refill Anoro elliptica         Relevant Medications    umeclidinium-vilanteroL (Anoro Ellipta) 62.5-25 mcg/actuation blister with device    History of positive hepatitis C    Current Assessment & Plan     History of recurrent cocaine use has been clean for some time previously treated chronic hepatitis C reevaluate risk factors for hepatitis and HIV.  Will also check [facility] evaluation for cirrhosis compensated and history of hepatitis C and discussed vaccinations of hepatitis a and B with next evaluation reevaluate documentation and vaccinate if necessary         Needs smoking cessation education    Current Assessment & Plan     Continue to urged him educate patient on importance  of smoking cessation down to less than 5 cigarettes a day working well         Relevant Medications    umeclidinium-vilanteroL (Anoro Ellipta) 62.5-25 mcg/actuation blister with device    Osteopenia of both hips    Current Assessment & Plan     Reevaluate bone density         Relevant Medications    umeclidinium-vilanteroL (Anoro Ellipta) 62.5-25 mcg/actuation blister with device    Cocaine abuse in remission (Multi)    Current Assessment & Plan     Repeat screenings for HIV and hepatitis given high risk status         Relevant Orders    Hepatitis panel, acute    HIV 1/2 Antigen/Antibody Screen with Reflex to Confirmation    Fibrospect Hepatitis C Virus (HCV)    Hepatitis C RNA, Quantitative, PCR    Alpha-Fetoprotein    Hypertensive cardiomyopathy, with heart failure (Multi) - Primary    Current Assessment & Plan     Well compensated and doing very well continue carvedilol 25 mg twice a day losartan 100 mg daily amlodipine 5 mg daily and spironolactone/hydrochlorothiazide 25/25 1 tablet daily reevaluate blood work         RESOLVED: Preoperative clearance    Compensated cirrhosis related to hepatitis C virus (HCV) (Multi)    Relevant Orders    Alpha-Fetoprotein    Screening mammogram for breast cancer    Current Assessment & Plan     Schedule screening mammogram         Relevant Orders    Follow Up In [facility] - PCP - Established    BI mammo bilateral screening tomosynthesis    Cardiomyopathy, dilated, nonischemic (Multi)    Current Assessment & Plan     Well compensated at this time continue carvedilol losartan spironolactone hydrochlorothiazide and amlodipine         Relevant Medications    umeclidinium-vilanteroL (Anoro Ellipta) 62.5-25 mcg/actuation blister with device

## 2024-05-07 NOTE — ASSESSMENT & PLAN NOTE
Well compensated and doing very well continue carvedilol 25 mg twice a day losartan 100 mg daily amlodipine 5 mg daily and spironolactone/hydrochlorothiazide 25/25 1 tablet daily reevaluate blood work

## 2024-05-07 NOTE — ASSESSMENT & PLAN NOTE
Continues to see psychiatrist and counselor stable on oxcarbazepine 600 mg twice a day with Abilify 5 mg nightly

## 2024-05-07 NOTE — ASSESSMENT & PLAN NOTE
History of recurrent cocaine use has been clean for some time previously treated chronic hepatitis C reevaluate risk factors for hepatitis and HIV.  Will also check [facility] evaluation for cirrhosis compensated and history of hepatitis C and discussed vaccinations of hepatitis a and B with next evaluation reevaluate documentation and vaccinate if necessary

## 2024-06-20 NOTE — ASSESSMENT & PLAN NOTE
Patient is medically cleared to proceed with right total knee arthroplasty   Isotretinoin Counseling: Patient should get monthly blood tests, not donate blood, not drive at night if vision affected, not share medication, and not undergo elective surgery for 6 months after tx completed. Side effects reviewed, pt to contact office should one occur. Low Dose Naltrexone Counseling- I discussed with the patient the potential risks and side effects of low dose naltrexone including but not limited to: more vivid dreams, headaches, nausea, vomiting, abdominal pain, fatigue, dizziness, and anxiety. Eucrisa Counseling: Patient may experience a mild burning sensation during topical application. Eucrisa is not approved in children less than 2 years of age. Erythromycin Counseling:  I discussed with the patient the risks of erythromycin including but not limited to GI upset, allergic reaction, drug rash, diarrhea, increase in liver enzymes, and yeast infections. Skyrizi Counseling: I discussed with the patient the risks of risankizumab-rzaa including but not limited to immunosuppression, and serious infections.  The patient understands that monitoring is required including a PPD at baseline and must alert us or the primary physician if symptoms of infection or other concerning signs are noted. Ivermectin Pregnancy And Lactation Text: This medication is Pregnancy Category C and it isn't known if it is safe during pregnancy. It is also excreted in breast milk. Azithromycin Pregnancy And Lactation Text: This medication is considered safe during pregnancy and is also secreted in breast milk. Rhofade Counseling: Rhofade is a topical medication which can decrease superficial blood flow where applied. Side effects are uncommon and include stinging, redness and allergic reactions. Carac Pregnancy And Lactation Text: This medication is Pregnancy Category X and contraindicated in pregnancy and in women who may become pregnant. It is unknown if this medication is excreted in breast milk. Zyclara Pregnancy And Lactation Text: This medication is Pregnancy Category C. It is unknown if this medication is excreted in breast milk. Colchicine Pregnancy And Lactation Text: This medication is Pregnancy Category C and isn't considered safe during pregnancy. It is excreted in breast milk. Ilumya Pregnancy And Lactation Text: The risk during pregnancy and breastfeeding is uncertain with this medication. Prednisone Pregnancy And Lactation Text: This medication is Pregnancy Category C and it isn't know if it is safe during pregnancy. This medication is excreted in breast milk. Dutasteride Female Counseling: Dutasteride Counseling:  I discussed with the patient the risks of use of dutasteride including but not limited to decreased libido and sexual dysfunction. Explained the teratogenic nature of the medication and stressed the importance of not getting pregnant during treatment. All of the patient's questions and concerns were addressed. Thalidomide Pregnancy And Lactation Text: This medication is Pregnancy Category X and is absolutely contraindicated during pregnancy. It is unknown if it is excreted in breast milk. Sarecycline Counseling: Patient advised regarding possible photosensitivity and discoloration of the teeth, skin, lips, tongue and gums.  Patient instructed to avoid sunlight, if possible.  When exposed to sunlight, patients should wear protective clothing, sunglasses, and sunscreen.  The patient was instructed to call the office immediately if the following severe adverse effects occur:  hearing changes, easy bruising/bleeding, severe headache, or vision changes.  The patient verbalized understanding of the proper use and possible adverse effects of sarecycline.  All of the patient's questions and concerns were addressed. Opzelura Counseling:  I discussed with the patient the risks of Opzelura including but not limited to nasopharngitis, bronchitis, ear infection, eosinophila, hives, diarrhea, folliculitis, tonsillitis, and rhinorrhea.  Taken orally, this medication has been linked to serious infections; higher rate of mortality; malignancy and lymphoproliferative disorders; major adverse cardiovascular events; thrombosis; thrombocytopenia, anemia, and neutropenia; and lipid elevations. Dupixent Counseling: I discussed with the patient the risks of dupilumab including but not limited to eye infection and irritation, cold sores, injection site reactions, worsening of asthma, allergic reactions and increased risk of parasitic infection.  Live vaccines should be avoided while taking dupilumab. Dupilumab will also interact with certain medications such as warfarin and cyclosporine. The patient understands that monitoring is required and they must alert us or the primary physician if symptoms of infection or other concerning signs are noted. Sotyktu Counseling:  I discussed the most common side effects of Sotyktu including: common cold, sore throat, sinus infections, cold sores, canker sores, folliculitis, and acne.  I also discussed more serious side effects of Sotyktu including but not limited to: serious allergic reactions; increased risk for infections such as TB; cancers such as lymphomas; rhabdomyolysis and elevated CPK; and elevated triglycerides and liver enzymes.  Cimetidine Pregnancy And Lactation Text: This medication is Pregnancy Category B and is considered safe during pregnancy. It is also excreted in breast milk and breast feeding isn't recommended. Topical Ketoconazole Counseling: Patient counseled that this medication may cause skin irritation or allergic reactions.  In the event of skin irritation, the patient was advised to reduce the amount of the drug applied or use it less frequently.   The patient verbalized understanding of the proper use and possible adverse effects of ketoconazole.  All of the patient's questions and concerns were addressed. Erythromycin Pregnancy And Lactation Text: This medication is Pregnancy Category B and is considered safe during pregnancy. It is also excreted in breast milk. Itraconazole Pregnancy And Lactation Text: This medication is Pregnancy Category C and it isn't know if it is safe during pregnancy. It is also excreted in breast milk. Rhofade Pregnancy And Lactation Text: This medication has not been assigned a Pregnancy Risk Category. It is unknown if the medication is excreted in breast milk. Low Dose Naltrexone Pregnancy And Lactation Text: Naltrexone is pregnancy category C.  There have been no adequate and well-controlled studies in pregnant women.  It should be used in pregnancy only if the potential benefit justifies the potential risk to the fetus.   Limited data indicates that naltrexone is minimally excreted into breastmilk. Cellcept Counseling:  I discussed with the patient the risks of mycophenolate mofetil including but not limited to infection/immunosuppression, GI upset, hypokalemia, hypercholesterolemia, bone marrow suppression, lymphoproliferative disorders, malignancy, GI ulceration/bleed/perforation, colitis, interstitial lung disease, kidney failure, progressive multifocal leukoencephalopathy, and birth defects.  The patient understands that monitoring is required including a baseline creatinine and regular CBC testing. In addition, patient must alert us immediately if symptoms of infection or other concerning signs are noted. Otezla Counseling: The side effects of Otezla were discussed with the patient, including but not limited to worsening or new depression, weight loss, diarrhea, nausea, upper respiratory tract infection, and headache. Patient instructed to call the office should any adverse effect occur.  The patient verbalized understanding of the proper use and possible adverse effects of Otezla.  All the patient's questions and concerns were addressed. Isotretinoin Pregnancy And Lactation Text: This medication is Pregnancy Category X and is considered extremely dangerous during pregnancy. It is unknown if it is excreted in breast milk. Infliximab Counseling:  I discussed with the patient the risks of infliximab including but not limited to myelosuppression, immunosuppression, autoimmune hepatitis, demyelinating diseases, lymphoma, and serious infections.  The patient understands that monitoring is required including a PPD at baseline and must alert us or the primary physician if symptoms of infection or other concerning signs are noted. Calcipotriene Counseling:  I discussed with the patient the risks of calcipotriene including but not limited to erythema, scaling, itching, and irritation. Bactrim Counseling:  I discussed with the patient the risks of sulfa antibiotics including but not limited to GI upset, allergic reaction, drug rash, diarrhea, dizziness, photosensitivity, and yeast infections.  Rarely, more serious reactions can occur including but not limited to aplastic anemia, agranulocytosis, methemoglobinemia, blood dyscrasias, liver or kidney failure, lung infiltrates or desquamative/blistering drug rashes. Xolair Pregnancy And Lactation Text: This medication is Pregnancy Category B and is considered safe during pregnancy. This medication is excreted in breast milk. Eucrisa Pregnancy And Lactation Text: This medication has not been assigned a Pregnancy Risk Category but animal studies failed to show danger with the topical medication. It is unknown if the medication is excreted in breast milk. Detail Level: Detailed Dapsone Counseling: I discussed with the patient the risks of dapsone including but not limited to hemolytic anemia, agranulocytosis, rashes, methemoglobinemia, kidney failure, peripheral neuropathy, headaches, GI upset, and liver toxicity.  Patients who start dapsone require monitoring including baseline LFTs and weekly CBCs for the first month, then every month thereafter.  The patient verbalized understanding of the proper use and possible adverse effects of dapsone.  All of the patient's questions and concerns were addressed. Tranexamic Acid Counseling:  Patient advised of the small risk of bleeding problems with tranexamic acid. They were also instructed to call if they developed any nausea, vomiting or diarrhea. All of the patient's questions and concerns were addressed. Doxepin Counseling:  Patient advised that the medication is sedating and not to drive a car after taking this medication. Patient informed of potential adverse effects including but not limited to dry mouth, urinary retention, and blurry vision.  The patient verbalized understanding of the proper use and possible adverse effects of doxepin.  All of the patient's questions and concerns were addressed. Dutasteride Pregnancy And Lactation Text: This medication is absolutely contraindicated in women, especially during pregnancy and breast feeding. Feminization of male fetuses is possible if taking while pregnant. Cellcept Pregnancy And Lactation Text: This medication is Pregnancy Category D and isn't considered safe during pregnancy. It is unknown if this medication is excreted in breast milk. Opioid Counseling: I discussed with the patient the potential side effects of opioids including but not limited to addiction, altered mental status, and depression. I stressed avoiding alcohol, benzodiazepines, muscle relaxants and sleep aids unless specifically okayed by a physician. The patient verbalized understanding of the proper use and possible adverse effects of opioids. All of the patient's questions and concerns were addressed. They were instructed to flush the remaining pills down the toilet if they did not need them for pain. Otezla Pregnancy And Lactation Text: This medication is Pregnancy Category C and it isn't known if it is safe during pregnancy. It is unknown if it is excreted in breast milk. Opzelura Pregnancy And Lactation Text: There is insufficient data to evaluate drug-associated risk for major birth defects, miscarriage, or other adverse maternal or fetal outcomes.  There is a pregnancy registry that monitors pregnancy outcomes in pregnant persons exposed to the medication during pregnancy.  It is unknown if this medication is excreted in breast milk.  Do not breastfeed during treatment and for about 4 weeks after the last dose. Adbry Counseling: I discussed with the patient the risks of tralokinumab including but not limited to eye infection and irritation, cold sores, injection site reactions, worsening of asthma, allergic reactions and increased risk of parasitic infection.  Live vaccines should be avoided while taking tralokinumab. The patient understands that monitoring is required and they must alert us or the primary physician if symptoms of infection or other concerning signs are noted. Sotyktu Pregnancy And Lactation Text: There is insufficient data to evaluate whether or not Sotyktu is safe to use during pregnancy.   It is not known if Sotyktu passes into breast milk and whether or not it is safe to use when breastfeeding.   Dupixent Pregnancy And Lactation Text: This medication likely crosses the placenta but the risk for the fetus is uncertain. This medication is excreted in breast milk. Sarecycline Pregnancy And Lactation Text: This medication is Pregnancy Category D and not consider safe during pregnancy. It is also excreted in breast milk. Metronidazole Counseling:  I discussed with the patient the risks of metronidazole including but not limited to seizures, nausea/vomiting, a metallic taste in the mouth, nausea/vomiting and severe allergy. Topical Ketoconazole Pregnancy And Lactation Text: This medication is Pregnancy Category B and is considered safe during pregnancy. It is unknown if it is excreted in breast milk. Cibinqo Counseling: I discussed with the patient the risks of Cibinqo therapy including but not limited to common cold, nausea, headache, cold sores, increased blood CPK levels, dizziness, UTIs, fatigue, acne, and vomitting. Live vaccines should be avoided.  This medication has been linked to serious infections; higher rate of mortality; malignancy and lymphoproliferative disorders; major adverse cardiovascular events; thrombosis; thrombocytopenia and lymphopenia; lipid elevations; and retinal detachment. Bactrim Pregnancy And Lactation Text: This medication is Pregnancy Category D and is known to cause fetal risk.  It is also excreted in breast milk. High Dose Vitamin A Counseling: Side effects reviewed, pt to contact office should one occur. Dapsone Pregnancy And Lactation Text: This medication is Pregnancy Category C and is not considered safe during pregnancy or breast feeding. Solaraze Counseling:  I discussed with the patient the risks of Solaraze including but not limited to erythema, scaling, itching, weeping, crusting, and pain. Niacinamide Counseling: I recommended taking niacin or niacinamide, also know as vitamin B3, twice daily. Recent evidence suggests that taking vitamin B3 (500 mg twice daily) can reduce the risk of actinic keratoses and non-melanoma skin cancers. Side effects of vitamin B3 include flushing and headache. Ketoconazole Counseling:   Patient counseled regarding improving absorption with orange juice.  Adverse effects include but are not limited to breast enlargement, headache, diarrhea, nausea, upset stomach, liver function test abnormalities, taste disturbance, and stomach pain.  There is a rare possibility of liver failure that can occur when taking ketoconazole. The patient understands that monitoring of LFTs may be required, especially at baseline. The patient verbalized understanding of the proper use and possible adverse effects of ketoconazole.  All of the patient's questions and concerns were addressed. Stelara Counseling:  I discussed with the patient the risks of ustekinumab including but not limited to immunosuppression, malignancy, posterior leukoencephalopathy syndrome, and serious infections.  The patient understands that monitoring is required including a PPD at baseline and must alert us or the primary physician if symptoms of infection or other concerning signs are noted. Hydroquinone Counseling:  Patient advised that medication may result in skin irritation, lightening (hypopigmentation), dryness, and burning.  In the event of skin irritation, the patient was advised to reduce the amount of the drug applied or use it less frequently.  Rarely, spots that are treated with hydroquinone can become darker (pseudoochronosis).  Should this occur, patient instructed to stop medication and call the office. The patient verbalized understanding of the proper use and possible adverse effects of hydroquinone.  All of the patient's questions and concerns were addressed. Infliximab Pregnancy And Lactation Text: This medication is Pregnancy Category B and is considered safe during pregnancy. It is unknown if this medication is excreted in breast milk. Calcipotriene Pregnancy And Lactation Text: This medication has not been proven safe during pregnancy. It is unknown if this medication is excreted in breast milk. Valtrex Pregnancy And Lactation Text: this medication is Pregnancy Category B and is considered safe during pregnancy. This medication is not directly found in breast milk but it's metabolite acyclovir is present. Topical Sulfur Applications Counseling: Topical Sulfur Counseling: Patient counseled that this medication may cause skin irritation or allergic reactions.  In the event of skin irritation, the patient was advised to reduce the amount of the drug applied or use it less frequently.   The patient verbalized understanding of the proper use and possible adverse effects of topical sulfur application.  All of the patient's questions and concerns were addressed. Oxybutynin Counseling:  I discussed with the patient the risks of oxybutynin including but not limited to skin rash, drowsiness, dry mouth, difficulty urinating, and blurred vision. Doxepin Pregnancy And Lactation Text: This medication is Pregnancy Category C and it isn't known if it is safe during pregnancy. It is also excreted in breast milk and breast feeding isn't recommended. Erivedge Counseling- I discussed with the patient the risks of Erivedge including but not limited to nausea, vomiting, diarrhea, constipation, weight loss, changes in the sense of taste, decreased appetite, muscle spasms, and hair loss.  The patient verbalized understanding of the proper use and possible adverse effects of Erivedge.  All of the patient's questions and concerns were addressed. Enbrel Counseling:  I discussed with the patient the risks of etanercept including but not limited to myelosuppression, immunosuppression, autoimmune hepatitis, demyelinating diseases, lymphoma, and infections.  The patient understands that monitoring is required including a PPD at baseline and must alert us or the primary physician if symptoms of infection or other concerning signs are noted. Cyclophosphamide Counseling:  I discussed with the patient the risks of cyclophosphamide including but not limited to hair loss, hormonal abnormalities, decreased fertility, abdominal pain, diarrhea, nausea and vomiting, bone marrow suppression and infection. The patient understands that monitoring is required while taking this medication. Tetracycline Counseling: Patient counseled regarding possible photosensitivity and increased risk for sunburn.  Patient instructed to avoid sunlight, if possible.  When exposed to sunlight, patients should wear protective clothing, sunglasses, and sunscreen.  The patient was instructed to call the office immediately if the following severe adverse effects occur:  hearing changes, easy bruising/bleeding, severe headache, or vision changes.  The patient verbalized understanding of the proper use and possible adverse effects of tetracycline.  All of the patient's questions and concerns were addressed. Patient understands to avoid pregnancy while on therapy due to potential birth defects. Tranexamic Acid Pregnancy And Lactation Text: It is unknown if this medication is safe during pregnancy or breast feeding. Xeljanz Counseling: I discussed with the patient the risks of Xeljanz therapy including increased risk of infection, liver issues, headache, diarrhea, or cold symptoms. Live vaccines should be avoided. They were instructed to call if they have any problems. Metronidazole Pregnancy And Lactation Text: This medication is Pregnancy Category B and considered safe during pregnancy.  It is also excreted in breast milk. Opioid Pregnancy And Lactation Text: These medications can lead to premature delivery and should be avoided during pregnancy. These medications are also present in breast milk in small amounts. Adbry Pregnancy And Lactation Text: It is unknown if this medication will adversely affect pregnancy or breast feeding. Finasteride Male Counseling: Finasteride Counseling:  I discussed with the patient the risks of use of finasteride including but not limited to decreased libido, decreased ejaculate volume, gynecomastia, and depression. Women should not handle medication.  All of the patient's questions and concerns were addressed. Cibinqo Pregnancy And Lactation Text: It is unknown if this medication will adversely affect pregnancy or breast feeding.  You should not take this medication if you are currently pregnant or planning a pregnancy or while breastfeeding. Solaraze Pregnancy And Lactation Text: This medication is Pregnancy Category B and is considered safe. There is some data to suggest avoiding during the third trimester. It is unknown if this medication is excreted in breast milk. Cephalexin Counseling: I counseled the patient regarding use of cephalexin as an antibiotic for prophylactic and/or therapeutic purposes. Cephalexin (commonly prescribed under brand name Keflex) is a cephalosporin antibiotic which is active against numerous classes of bacteria, including most skin bacteria. Side effects may include nausea, diarrhea, gastrointestinal upset, rash, hives, yeast infections, and in rare cases, hepatitis, kidney disease, seizures, fever, confusion, neurologic symptoms, and others. Patients with severe allergies to penicillin medications are cautioned that there is about a 10% incidence of cross-reactivity with cephalosporins. When possible, patients with penicillin allergies should use alternatives to cephalosporins for antibiotic therapy. Include Pregnancy/Lactation Warning?: No High Dose Vitamin A Pregnancy And Lactation Text: High dose vitamin A therapy is contraindicated during pregnancy and breast feeding. Gabapentin Counseling: I discussed with the patient the risks of gabapentin including but not limited to dizziness, somnolence, fatigue and ataxia. Niacinamide Pregnancy And Lactation Text: These medications are considered safe during pregnancy. 5-Fu Counseling: 5-Fluorouracil Counseling:  I discussed with the patient the risks of 5-fluorouracil including but not limited to erythema, scaling, itching, weeping, crusting, and pain. Birth Control Pills Counseling: Birth Control Pill Counseling: I discussed with the patient the potential side effects of OCPs including but not limited to increased risk of stroke, heart attack, thrombophlebitis, deep venous thrombosis, hepatic adenomas, breast changes, GI upset, headaches, and depression.  The patient verbalized understanding of the proper use and possible adverse effects of OCPs. All of the patient's questions and concerns were addressed. Rituxan Counseling:  I discussed with the patient the risks of Rituxan infusions. Side effects can include infusion reactions, severe drug rashes including mucocutaneous reactions, reactivation of latent hepatitis and other infections and rarely progressive multifocal leukoencephalopathy.  All of the patient's questions and concerns were addressed. Xelfayez Pregnancy And Lactation Text: This medication is Pregnancy Category D and is not considered safe during pregnancy.  The risk during breast feeding is also uncertain. Topical Retinoid counseling:  Patient advised to apply a pea-sized amount only at bedtime and wait 30 minutes after washing their face before applying.  If too drying, patient may add a non-comedogenic moisturizer. The patient verbalized understanding of the proper use and possible adverse effects of retinoids.  All of the patient's questions and concerns were addressed. Cyclophosphamide Pregnancy And Lactation Text: This medication is Pregnancy Category D and it isn't considered safe during pregnancy. This medication is excreted in breast milk. Topical Sulfur Applications Pregnancy And Lactation Text: This medication is Pregnancy Category C and has an unknown safety profile during pregnancy. It is unknown if this topical medication is excreted in breast milk. Valtrex Counseling: I discussed with the patient the risks of valacyclovir including but not limited to kidney damage, nausea, vomiting and severe allergy.  The patient understands that if the infection seems to be worsening or is not improving, they are to call. Hydroxyzine Counseling: Patient advised that the medication is sedating and not to drive a car after taking this medication.  Patient informed of potential adverse effects including but not limited to dry mouth, urinary retention, and blurry vision.  The patient verbalized understanding of the proper use and possible adverse effects of hydroxyzine.  All of the patient's questions and concerns were addressed. Finasteride Female Counseling: Finasteride Counseling:  I discussed with the patient the risks of use of finasteride including but not limited to decreased libido and sexual dysfunction. Explained the teratogenic nature of the medication and stressed the importance of not getting pregnant during treatment. All of the patient's questions and concerns were addressed. Bimzelx Counseling:  I discussed with the patient the risks of Bimzelx including but not limited to depression, immunosuppression, allergic reactions and infections.  The patient understands that monitoring is required including a PPD at baseline and must alert us or the primary physician if symptoms of infection or other concerning signs are noted. Nsaids Counseling: NSAID Counseling: I discussed with the patient that NSAIDs should be taken with food. Prolonged use of NSAIDs can result in the development of stomach ulcers.  Patient advised to stop taking NSAIDs if abdominal pain occurs.  The patient verbalized understanding of the proper use and possible adverse effects of NSAIDs.  All of the patient's questions and concerns were addressed. Litfulo Counseling: Litfulo (Ritlecitinib) Counseling: I discussed with the patient the risks of Litfulo therapy including but not limited to headache, upper respiratory infections, nausea, diarrhea, acne, and urticaria. Live vaccines should be avoided.  This medication has been linked to serious infections; higher rate of mortality; malignancy and lymphoproliferative disorders; major adverse cardiovascular events; thrombosis; decreases in lymphocytes and platelets; liver enzyme elevations; and CPK elevations. Imiquimod Counseling:  I discussed with the patient the risks of imiquimod including but not limited to erythema, scaling, itching, weeping, crusting, and pain.  Patient understands that the inflammatory response to imiquimod is variable from person to person and was educated regarded proper titration schedule.  If flu-like symptoms develop, patient knows to discontinue the medication and contact us. Minocycline Counseling: Patient advised regarding possible photosensitivity and discoloration of the teeth, skin, lips, tongue and gums.  Patient instructed to avoid sunlight, if possible.  When exposed to sunlight, patients should wear protective clothing, sunglasses, and sunscreen.  The patient was instructed to call the office immediately if the following severe adverse effects occur:  hearing changes, easy bruising/bleeding, severe headache, or vision changes.  The patient verbalized understanding of the proper use and possible adverse effects of minocycline.  All of the patient's questions and concerns were addressed. Cephalexin Pregnancy And Lactation Text: This medication is Pregnancy Category B and considered safe during pregnancy.  It is also excreted in breast milk but can be used safely for shorter doses. Libtayo Counseling- I discussed with the patient the risks of Libtayo including but not limited to nausea, vomiting, diarrhea, and bone or muscle pain.  The patient verbalized understanding of the proper use and possible adverse effects of Libtayo.  All of the patient's questions and concerns were addressed. Arava Counseling:  Patient counseled regarding adverse effects of Arava including but not limited to nausea, vomiting, abnormalities in liver function tests. Patients may develop mouth sores, rash, diarrhea, and abnormalities in blood counts. The patient understands that monitoring is required including LFTs and blood counts.  There is a rare possibility of scarring of the liver and lung problems that can occur when taking methotrexate. Persistent nausea, loss of appetite, pale stools, dark urine, cough, and shortness of breath should be reported immediately. Patient advised to discontinue Arava treatment and consult with a physician prior to attempting conception. The patient will have to undergo a treatment to eliminate Arava from the body prior to conception. Rituxan Pregnancy And Lactation Text: This medication is Pregnancy Category C and it isn't know if it is safe during pregnancy. It is unknown if this medication is excreted in breast milk but similar antibodies are known to be excreted. Cyclosporine Counseling:  I discussed with the patient the risks of cyclosporine including but not limited to hypertension, gingival hyperplasia,myelosuppression, immunosuppression, liver damage, kidney damage, neurotoxicity, lymphoma, and serious infections. The patient understands that monitoring is required including baseline blood pressure, CBC, CMP, lipid panel and uric acid, and then 1-2 times monthly CMP and blood pressure. Birth Control Pills Pregnancy And Lactation Text: This medication should be avoided if pregnant and for the first 30 days post-partum. Azelaic Acid Counseling: Patient counseled that medicine may cause skin irritation and to avoid applying near the eyes.  In the event of skin irritation, the patient was advised to reduce the amount of the drug applied or use it less frequently.   The patient verbalized understanding of the proper use and possible adverse effects of azelaic acid.  All of the patient's questions and concerns were addressed. Finasteride Pregnancy And Lactation Text: This medication is absolutely contraindicated during pregnancy. It is unknown if it is excreted in breast milk. Humira Counseling:  I discussed with the patient the risks of adalimumab including but not limited to myelosuppression, immunosuppression, autoimmune hepatitis, demyelinating diseases, lymphoma, and serious infections.  The patient understands that monitoring is required including a PPD at baseline and must alert us or the primary physician if symptoms of infection or other concerning signs are noted. Hydroxyzine Pregnancy And Lactation Text: This medication is not safe during pregnancy and should not be taken. It is also excreted in breast milk and breast feeding isn't recommended. Wartpeel Counseling:  I discussed with the patient the risks of Wartpeel including but not limited to erythema, scaling, itching, weeping, crusting, and pain. Litfulo Pregnancy And Lactation Text: There is insufficient data to evaluate whether or not Litfulo is safe to use during pregnancy.  Breastfeeding is not recommended during treatment. Nsaids Pregnancy And Lactation Text: These medications are considered safe up to 30 weeks gestation. It is excreted in breast milk. Propranolol Counseling:  I discussed with the patient the risks of propranolol including but not limited to low heart rate, low blood pressure, low blood sugar, restlessness and increased cold sensitivity. They should call the office if they experience any of these side effects. Bimzelx Pregnancy And Lactation Text: This medication crosses the placenta and the safety is uncertain during pregnancy. It is unknown if this medication is present in breast milk. Taltz Counseling: I discussed with the patient the risks of ixekizumab including but not limited to immunosuppression, serious infections, worsening of inflammatory bowel disease and drug reactions.  The patient understands that monitoring is required including a PPD at baseline and must alert us or the primary physician if symptoms of infection or other concerning signs are noted. Acitretin Counseling:  I discussed with the patient the risks of acitretin including but not limited to hair loss, dry lips/skin/eyes, liver damage, hyperlipidemia, depression/suicidal ideation, photosensitivity.  Serious rare side effects can include but are not limited to pancreatitis, pseudotumor cerebri, bony changes, clot formation/stroke/heart attack.  Patient understands that alcohol is contraindicated since it can result in liver toxicity and significantly prolong the elimination of the drug by many years. Ketoconazole Pregnancy And Lactation Text: This medication is Pregnancy Category C and it isn't know if it is safe during pregnancy. It is also excreted in breast milk and breast feeding isn't recommended. Libtayo Pregnancy And Lactation Text: This medication is contraindicated in pregnancy and when breast feeding. Drysol Counseling:  I discussed with the patient the risks of drysol/aluminum chloride including but not limited to skin rash, itching, irritation, burning. Clindamycin Counseling: I counseled the patient regarding use of clindamycin as an antibiotic for prophylactic and/or therapeutic purposes. Clindamycin is active against numerous classes of bacteria, including skin bacteria. Side effects may include nausea, diarrhea, gastrointestinal upset, rash, hives, yeast infections, and in rare cases, colitis. Siliq Counseling:  I discussed with the patient the risks of Siliq including but not limited to new or worsening depression, suicidal thoughts and behavior, immunosuppression, malignancy, posterior leukoencephalopathy syndrome, and serious infections.  The patient understands that monitoring is required including a PPD at baseline and must alert us or the primary physician if symptoms of infection or other concerning signs are noted. There is also a special program designed to monitor depression which is required with Siliq. Glycopyrrolate Counseling:  I discussed with the patient the risks of glycopyrrolate including but not limited to skin rash, drowsiness, dry mouth, difficulty urinating, and blurred vision. Picato Counseling:  I discussed with the patient the risks of Picato including but not limited to erythema, scaling, itching, weeping, crusting, and pain. Fluconazole Counseling:  Patient counseled regarding adverse effects of fluconazole including but not limited to headache, diarrhea, nausea, upset stomach, liver function test abnormalities, taste disturbance, and stomach pain.  There is a rare possibility of liver failure that can occur when taking fluconazole.  The patient understands that monitoring of LFTs and kidney function test may be required, especially at baseline. The patient verbalized understanding of the proper use and possible adverse effects of fluconazole.  All of the patient's questions and concerns were addressed. Spironolactone Counseling: Patient advised regarding risks of diarrhea, abdominal pain, hyperkalemia, birth defects (for female patients), liver toxicity and renal toxicity. The patient may need blood work to monitor liver and kidney function and potassium levels while on therapy. The patient verbalized understanding of the proper use and possible adverse effects of spironolactone.  All of the patient's questions and concerns were addressed. Azelaic Acid Pregnancy And Lactation Text: This medication is considered safe during pregnancy and breast feeding. Propranolol Pregnancy And Lactation Text: This medication is Pregnancy Category C and it isn't known if it is safe during pregnancy. It is excreted in breast milk. Quinolones Counseling:  I discussed with the patient the risks of fluoroquinolones including but not limited to GI upset, allergic reaction, drug rash, diarrhea, dizziness, photosensitivity, yeast infections, liver function test abnormalities, tendonitis/tendon rupture. Cimzia Counseling:  I discussed with the patient the risks of Cimzia including but not limited to immunosuppression, allergic reactions and infections.  The patient understands that monitoring is required including a PPD at baseline and must alert us or the primary physician if symptoms of infection or other concerning signs are noted. Olumiant Counseling: I discussed with the patient the risks of Olumiant therapy including but not limited to upper respiratory tract infections, shingles, cold sores, and nausea. Live vaccines should be avoided.  This medication has been linked to serious infections; higher rate of mortality; malignancy and lymphoproliferative disorders; major adverse cardiovascular events; thrombosis; gastrointestinal perforations; neutropenia; lymphopenia; anemia; liver enzyme elevations; and lipid elevations. Minoxidil Counseling: Minoxidil is a topical medication which can increase blood flow where it is applied. It is uncertain how this medication increases hair growth. Side effects are uncommon and include stinging and allergic reactions. Clindamycin Pregnancy And Lactation Text: This medication can be used in pregnancy if certain situations. Clindamycin is also present in breast milk. Glycopyrrolate Pregnancy And Lactation Text: This medication is Pregnancy Category B and is considered safe during pregnancy. It is unknown if it is excreted breast milk. Olanzapine Counseling- I discussed with the patient the common side effects of olanzapine including but are not limited to: lack of energy, dry mouth, increased appetite, sleepiness, tremor, constipation, dizziness, changes in behavior, or restlessness.  Explained that teenagers are more likely to experience headaches, abdominal pain, pain in the arms or legs, tiredness, and sleepiness.  Serious side effects include but are not limited: increased risk of death in elderly patients who are confused, have memory loss, or dementia-related psychosis; hyperglycemia; increased cholesterol and triglycerides; and weight gain. Tazorac Counseling:  Patient advised that medication is irritating and drying.  Patient may need to apply sparingly and wash off after an hour before eventually leaving it on overnight.  The patient verbalized understanding of the proper use and possible adverse effects of tazorac.  All of the patient's questions and concerns were addressed. Acitretin Pregnancy And Lactation Text: This medication is Pregnancy Category X and should not be given to women who are pregnant or may become pregnant in the future. This medication is excreted in breast milk. Albendazole Counseling:  I discussed with the patient the risks of albendazole including but not limited to cytopenia, kidney damage, nausea/vomiting and severe allergy.  The patient understands that this medication is being used in an off-label manner. Terbinafine Counseling: Patient counseling regarding adverse effects of terbinafine including but not limited to headache, diarrhea, rash, upset stomach, liver function test abnormalities, itching, taste/smell disturbance, nausea, abdominal pain, and flatulence.  There is a rare possibility of liver failure that can occur when taking terbinafine.  The patient understands that a baseline LFT and kidney function test may be required. The patient verbalized understanding of the proper use and possible adverse effects of terbinafine.  All of the patient's questions and concerns were addressed. Benzoyl Peroxide Counseling: Patient counseled that medicine may cause skin irritation and bleach clothing.  In the event of skin irritation, the patient was advised to reduce the amount of the drug applied or use it less frequently.   The patient verbalized understanding of the proper use and possible adverse effects of benzoyl peroxide.  All of the patient's questions and concerns were addressed. Clofazimine Counseling:  I discussed with the patient the risks of clofazimine including but not limited to skin and eye pigmentation, liver damage, nausea/vomiting, gastrointestinal bleeding and allergy. Spironolactone Pregnancy And Lactation Text: This medication can cause feminization of the male fetus and should be avoided during pregnancy. The active metabolite is also found in breast milk. SSKI Counseling:  I discussed with the patient the risks of SSKI including but not limited to thyroid abnormalities, metallic taste, GI upset, fever, headache, acne, arthralgias, paraesthesias, lymphadenopathy, easy bleeding, arrhythmias, and allergic reaction. Winlevi Counseling:  I discussed with the patient the risks of topical clascoterone including but not limited to erythema, scaling, itching, and stinging. Patient voiced their understanding. Odomzo Counseling- I discussed with the patient the risks of Odomzo including but not limited to nausea, vomiting, diarrhea, constipation, weight loss, changes in the sense of taste, decreased appetite, muscle spasms, and hair loss.  The patient verbalized understanding of the proper use and possible adverse effects of Odomzo.  All of the patient's questions and concerns were addressed. Methotrexate Counseling:  Patient counseled regarding adverse effects of methotrexate including but not limited to nausea, vomiting, abnormalities in liver function tests. Patients may develop mouth sores, rash, diarrhea, and abnormalities in blood counts. The patient understands that monitoring is required including LFT's and blood counts.  There is a rare possibility of scarring of the liver and lung problems that can occur when taking methotrexate. Persistent nausea, loss of appetite, pale stools, dark urine, cough, and shortness of breath should be reported immediately. Patient advised to discontinue methotrexate treatment at least three months before attempting to become pregnant.  I discussed the need for folate supplements while taking methotrexate.  These supplements can decrease side effects during methotrexate treatment. The patient verbalized understanding of the proper use and possible adverse effects of methotrexate.  All of the patient's questions and concerns were addressed. Hyrimoz Counseling:  I discussed with the patient the risks of adalimumab including but not limited to myelosuppression, immunosuppression, autoimmune hepatitis, demyelinating diseases, lymphoma, and serious infections.  The patient understands that monitoring is required including a PPD at baseline and must alert us or the primary physician if symptoms of infection or other concerning signs are noted. Cimzia Pregnancy And Lactation Text: This medication crosses the placenta but can be considered safe in certain situations. Cimzia may be excreted in breast milk. Olumiant Pregnancy And Lactation Text: Based on animal studies, Olumiant may cause embryo-fetal harm when administered to pregnant women.  The medication should not be used in pregnancy.  Breastfeeding is not recommended during treatment. Doxycycline Counseling:  Patient counseled regarding possible photosensitivity and increased risk for sunburn.  Patient instructed to avoid sunlight, if possible.  When exposed to sunlight, patients should wear protective clothing, sunglasses, and sunscreen.  The patient was instructed to call the office immediately if the following severe adverse effects occur:  hearing changes, easy bruising/bleeding, severe headache, or vision changes.  The patient verbalized understanding of the proper use and possible adverse effects of doxycycline.  All of the patient's questions and concerns were addressed. Olanzapine Pregnancy And Lactation Text: This medication is pregnancy category C.   There are no adequate and well controlled trials with olanzapine in pregnant females.  Olanzapine should be used during pregnancy only if the potential benefit justifies the potential risk to the fetus.   In a study in lactating healthy women, olanzapine was excreted in breast milk.  It is recommended that women taking olanzapine should not breast feed. Tazorac Pregnancy And Lactation Text: This medication is not safe during pregnancy. It is unknown if this medication is excreted in breast milk. Protopic Counseling: Patient may experience a mild burning sensation during topical application. Protopic is not approved in children less than 2 years of age. There have been case reports of hematologic and skin malignancies in patients using topical calcineurin inhibitors although causality is questionable. Griseofulvin Counseling:  I discussed with the patient the risks of griseofulvin including but not limited to photosensitivity, cytopenia, liver damage, nausea/vomiting and severe allergy.  The patient understands that this medication is best absorbed when taken with a fatty meal (e.g., ice cream or french fries). Bexarotene Counseling:  I discussed with the patient the risks of bexarotene including but not limited to hair loss, dry lips/skin/eyes, liver abnormalities, hyperlipidemia, pancreatitis, depression/suicidal ideation, photosensitivity, drug rash/allergic reactions, hypothyroidism, anemia, leukopenia, infection, cataracts, and teratogenicity.  Patient understands that they will need regular blood tests to check lipid profile, liver function tests, white blood cell count, thyroid function tests and pregnancy test if applicable. Hydroxychloroquine Counseling:  I discussed with the patient that a baseline ophthalmologic exam is needed at the start of therapy and every year thereafter while on therapy. A CBC may also be warranted for monitoring.  The side effects of this medication were discussed with the patient, including but not limited to agranulocytosis, aplastic anemia, seizures, rashes, retinopathy, and liver toxicity. Patient instructed to call the office should any adverse effect occur.  The patient verbalized understanding of the proper use and possible adverse effects of Plaquenil.  All the patient's questions and concerns were addressed. Tremfya Counseling: I discussed with the patient the risks of guselkumab including but not limited to immunosuppression, serious infections, worsening of inflammatory bowel disease and drug reactions.  The patient understands that monitoring is required including a PPD at baseline and must alert us or the primary physician if symptoms of infection or other concerning signs are noted. Elidel Counseling: Patient may experience a mild burning sensation during topical application. Elidel is not approved in children less than 2 years of age. There have been case reports of hematologic and skin malignancies in patients using topical calcineurin inhibitors although causality is questionable. Simponi Counseling:  I discussed with the patient the risks of golimumab including but not limited to myelosuppression, immunosuppression, autoimmune hepatitis, demyelinating diseases, lymphoma, and serious infections.  The patient understands that monitoring is required including a PPD at baseline and must alert us or the primary physician if symptoms of infection or other concerning signs are noted. Benzoyl Peroxide Pregnancy And Lactation Text: This medication is Pregnancy Category C. It is unknown if benzoyl peroxide is excreted in breast milk. Topical Clindamycin Counseling: Patient counseled that this medication may cause skin irritation or allergic reactions.  In the event of skin irritation, the patient was advised to reduce the amount of the drug applied or use it less frequently.   The patient verbalized understanding of the proper use and possible adverse effects of clindamycin.  All of the patient's questions and concerns were addressed. Methotrexate Pregnancy And Lactation Text: This medication is Pregnancy Category X and is known to cause fetal harm. This medication is excreted in breast milk. Oral Minoxidil Counseling- I discussed with the patient the risks of oral minoxidil including but not limited to shortness of breath, swelling of the feet or ankles, dizziness, lightheadedness, unwanted hair growth and allergic reaction.  The patient verbalized understanding of the proper use and possible adverse effects of oral minoxidil.  All of the patient's questions and concerns were addressed. Winlevi Pregnancy And Lactation Text: This medication is considered safe during pregnancy and breastfeeding. Sski Pregnancy And Lactation Text: This medication is Pregnancy Category D and isn't considered safe during pregnancy. It is excreted in breast milk. Azathioprine Counseling:  I discussed with the patient the risks of azathioprine including but not limited to myelosuppression, immunosuppression, hepatotoxicity, lymphoma, and infections.  The patient understands that monitoring is required including baseline LFTs, Creatinine, possible TPMP genotyping and weekly CBCs for the first month and then every 2 weeks thereafter.  The patient verbalized understanding of the proper use and possible adverse effects of azathioprine.  All of the patient's questions and concerns were addressed. Rifampin Counseling: I discussed with the patient the risks of rifampin including but not limited to liver damage, kidney damage, red-orange body fluids, nausea/vomiting and severe allergy. Cosentyx Counseling:  I discussed with the patient the risks of Cosentyx including but not limited to worsening of Crohn's disease, immunosuppression, allergic reactions and infections.  The patient understands that monitoring is required including a PPD at baseline and must alert us or the primary physician if symptoms of infection or other concerning signs are noted. Rinvoq Counseling: I discussed with the patient the risks of Rinvoq therapy including but not limited to upper respiratory tract infections, shingles, cold sores, bronchitis, nausea, cough, fever, acne, and headache. Live vaccines should be avoided.  This medication has been linked to serious infections; higher rate of mortality; malignancy and lymphoproliferative disorders; major adverse cardiovascular events; thrombosis; thrombocytopenia, anemia, and neutropenia; lipid elevations; liver enzyme elevations; and gastrointestinal perforations. Ivermectin Counseling:  Patient instructed to take medication on an empty stomach with a full glass of water.  Patient informed of potential adverse effects including but not limited to nausea, diarrhea, dizziness, itching, and swelling of the extremities or lymph nodes.  The patient verbalized understanding of the proper use and possible adverse effects of ivermectin.  All of the patient's questions and concerns were addressed. Mirvaso Counseling: Mirvaso is a topical medication which can decrease superficial blood flow where applied. Side effects are uncommon and include stinging, redness and allergic reactions. Doxycycline Pregnancy And Lactation Text: This medication is Pregnancy Category D and not consider safe during pregnancy. It is also excreted in breast milk but is considered safe for shorter treatment courses. Griseofulvin Pregnancy And Lactation Text: This medication is Pregnancy Category X and is known to cause serious birth defects. It is unknown if this medication is excreted in breast milk but breast feeding should be avoided. Protopic Pregnancy And Lactation Text: This medication is Pregnancy Category C. It is unknown if this medication is excreted in breast milk when applied topically. Azithromycin Counseling:  I discussed with the patient the risks of azithromycin including but not limited to GI upset, allergic reaction, drug rash, diarrhea, and yeast infections. Bexarotene Pregnancy And Lactation Text: This medication is Pregnancy Category X and should not be given to women who are pregnant or may become pregnant. This medication should not be used if you are breast feeding. Colchicine Counseling:  Patient counseled regarding adverse effects including but not limited to stomach upset (nausea, vomiting, stomach pain, or diarrhea).  Patient instructed to limit alcohol consumption while taking this medication.  Colchicine may reduce blood counts especially with prolonged use.  The patient understands that monitoring of kidney function and blood counts may be required, especially at baseline. The patient verbalized understanding of the proper use and possible adverse effects of colchicine.  All of the patient's questions and concerns were addressed. Hydroxychloroquine Pregnancy And Lactation Text: This medication has been shown to cause fetal harm but it isn't assigned a Pregnancy Risk Category. There are small amounts excreted in breast milk. Prednisone Counseling:  I discussed with the patient the risks of prolonged use of prednisone including but not limited to weight gain, insomnia, osteoporosis, mood changes, diabetes, susceptibility to infection, glaucoma and high blood pressure.  In cases where prednisone use is prolonged, patients should be monitored with blood pressure checks, serum glucose levels and an eye exam.  Additionally, the patient may need to be placed on GI prophylaxis, PCP prophylaxis, and calcium and vitamin D supplementation and/or a bisphosphonate.  The patient verbalized understanding of the proper use and the possible adverse effects of prednisone.  All of the patient's questions and concerns were addressed. Carac Counseling:  I discussed with the patient the risks of Carac including but not limited to erythema, scaling, itching, weeping, crusting, and pain. Ilumya Counseling: I discussed with the patient the risks of tildrakizumab including but not limited to immunosuppression, malignancy, posterior leukoencephalopathy syndrome, and serious infections.  The patient understands that monitoring is required including a PPD at baseline and must alert us or the primary physician if symptoms of infection or other concerning signs are noted. Zyclara Counseling:  I discussed with the patient the risks of imiquimod including but not limited to erythema, scaling, itching, weeping, crusting, and pain.  Patient understands that the inflammatory response to imiquimod is variable from person to person and was educated regarded proper titration schedule.  If flu-like symptoms develop, patient knows to discontinue the medication and contact us. Rinvoq Pregnancy And Lactation Text: Based on animal studies, Rinvoq may cause embryo-fetal harm when administered to pregnant women.  The medication should not be used in pregnancy.  Breastfeeding is not recommended during treatment and for 6 days after the last dose. Itraconazole Counseling:  I discussed with the patient the risks of itraconazole including but not limited to liver damage, nausea/vomiting, neuropathy, and severe allergy.  The patient understands that this medication is best absorbed when taken with acidic beverages such as non-diet cola or ginger ale.  The patient understands that monitoring is required including baseline LFTs and repeat LFTs at intervals.  The patient understands that they are to contact us or the primary physician if concerning signs are noted. Xolair Counseling:  Patient informed of potential adverse effects including but not limited to fever, muscle aches, rash and allergic reactions.  The patient verbalized understanding of the proper use and possible adverse effects of Xolair.  All of the patient's questions and concerns were addressed. Oral Minoxidil Pregnancy And Lactation Text: This medication should only be used when clearly needed if you are pregnant, attempting to become pregnant or breast feeding. Cimetidine Counseling:  I discussed with the patient the risks of Cimetidine including but not limited to gynecomastia, headache, diarrhea, nausea, drowsiness, arrhythmias, pancreatitis, skin rashes, psychosis, bone marrow suppression and kidney toxicity. Dutasteride Male Counseling: Dustasteride Counseling:  I discussed with the patient the risks of use of dutasteride including but not limited to decreased libido, decreased ejaculate volume, and gynecomastia. Women who can become pregnant should not handle medication.  All of the patient's questions and concerns were addressed. Rifampin Pregnancy And Lactation Text: This medication is Pregnancy Category C and it isn't know if it is safe during pregnancy. It is also excreted in breast milk and should not be used if you are breast feeding. Thalidomide Counseling: I discussed with the patient the risks of thalidomide including but not limited to birth defects, anxiety, weakness, chest pain, dizziness, cough and severe allergy.

## 2024-07-26 DIAGNOSIS — J43.2 CENTRILOBULAR EMPHYSEMA (MULTI): ICD-10-CM

## 2024-07-26 RX ORDER — ALBUTEROL SULFATE 90 UG/1
AEROSOL, METERED RESPIRATORY (INHALATION)
Qty: 4 G | Refills: 3 | Status: SHIPPED | OUTPATIENT
Start: 2024-07-26 | End: 2025-07-26

## 2024-10-30 ENCOUNTER — APPOINTMENT (OUTPATIENT)
Dept: RADIOLOGY | Facility: CLINIC | Age: 64
End: 2024-10-30
Payer: COMMERCIAL

## 2024-11-07 ENCOUNTER — APPOINTMENT (OUTPATIENT)
Dept: PRIMARY CARE | Facility: CLINIC | Age: 64
End: 2024-11-07
Payer: COMMERCIAL

## 2024-11-27 ENCOUNTER — HOSPITAL ENCOUNTER (OUTPATIENT)
Dept: RADIOLOGY | Facility: HOSPITAL | Age: 64
Discharge: HOME | End: 2024-11-27
Payer: COMMERCIAL

## 2024-11-27 VITALS — WEIGHT: 142 LBS | BODY MASS INDEX: 24.24 KG/M2 | HEIGHT: 64 IN

## 2024-11-27 DIAGNOSIS — Z12.31 SCREENING MAMMOGRAM FOR BREAST CANCER: ICD-10-CM

## 2024-11-27 PROCEDURE — 77063 BREAST TOMOSYNTHESIS BI: CPT | Performed by: RADIOLOGY

## 2024-11-27 PROCEDURE — 77067 SCR MAMMO BI INCL CAD: CPT | Performed by: RADIOLOGY

## 2024-11-27 PROCEDURE — 77067 SCR MAMMO BI INCL CAD: CPT

## 2024-11-29 DIAGNOSIS — R92.8 ABNORMALITY OF LEFT BREAST ON SCREENING MAMMOGRAM: Primary | ICD-10-CM

## 2024-12-02 ENCOUNTER — TELEPHONE (OUTPATIENT)
Dept: PRIMARY CARE | Facility: CLINIC | Age: 64
End: 2024-12-02
Payer: COMMERCIAL

## 2024-12-02 NOTE — TELEPHONE ENCOUNTER
----- Message from Nelson Leavitt sent at 11/29/2024  9:20 AM EST -----  Radiololgy requesting magnified views of left breast. Will schedule.

## 2024-12-12 ENCOUNTER — HOSPITAL ENCOUNTER (OUTPATIENT)
Dept: RADIOLOGY | Facility: HOSPITAL | Age: 64
Discharge: HOME | End: 2024-12-12
Payer: COMMERCIAL

## 2024-12-12 DIAGNOSIS — R92.8 ABNORMALITY OF LEFT BREAST ON SCREENING MAMMOGRAM: ICD-10-CM

## 2024-12-12 PROCEDURE — 77065 DX MAMMO INCL CAD UNI: CPT | Mod: LEFT SIDE

## 2024-12-12 PROCEDURE — 77061 BREAST TOMOSYNTHESIS UNI: CPT | Mod: LEFT SIDE

## 2024-12-12 PROCEDURE — 76642 ULTRASOUND BREAST LIMITED: CPT | Mod: LT

## 2024-12-12 PROCEDURE — 76642 ULTRASOUND BREAST LIMITED: CPT | Mod: LEFT SIDE

## 2024-12-12 PROCEDURE — 77065 DX MAMMO INCL CAD UNI: CPT | Mod: LT

## 2024-12-12 PROCEDURE — 76982 USE 1ST TARGET LESION: CPT | Mod: LT

## 2025-01-17 DIAGNOSIS — F17.200 SMOKING ADDICTION: ICD-10-CM

## 2025-01-17 DIAGNOSIS — I50.22 CHRONIC SYSTOLIC CONGESTIVE HEART FAILURE: ICD-10-CM

## 2025-01-17 DIAGNOSIS — F17.200 NEEDS SMOKING CESSATION EDUCATION: ICD-10-CM

## 2025-01-17 DIAGNOSIS — F31.9 BIPOLAR 1 DISORDER (MULTI): ICD-10-CM

## 2025-01-17 DIAGNOSIS — M17.11 PRIMARY OSTEOARTHRITIS OF RIGHT KNEE: ICD-10-CM

## 2025-01-17 DIAGNOSIS — I42.0 CARDIOMYOPATHY, DILATED, NONISCHEMIC (MULTI): ICD-10-CM

## 2025-01-17 DIAGNOSIS — J84.82 ADULT PULMONARY LANGERHANS CELL HISTIOCYTOSIS (MULTI): ICD-10-CM

## 2025-01-17 DIAGNOSIS — Z00.00 WELCOME TO MEDICARE PREVENTIVE VISIT: ICD-10-CM

## 2025-01-17 DIAGNOSIS — H60.8X3 CHRONIC ECZEMATOUS OTITIS EXTERNA OF BOTH EARS: ICD-10-CM

## 2025-01-17 DIAGNOSIS — M85.851 OSTEOPENIA OF BOTH HIPS: ICD-10-CM

## 2025-01-17 DIAGNOSIS — I11.0 HYPERTENSIVE CARDIOMYOPATHY, WITH HEART FAILURE: ICD-10-CM

## 2025-01-17 DIAGNOSIS — J43.2 CENTRILOBULAR EMPHYSEMA (MULTI): ICD-10-CM

## 2025-01-17 DIAGNOSIS — M85.852 OSTEOPENIA OF BOTH HIPS: ICD-10-CM

## 2025-01-17 DIAGNOSIS — I43 HYPERTENSIVE CARDIOMYOPATHY, WITH HEART FAILURE: ICD-10-CM

## 2025-01-17 DIAGNOSIS — I42.0 DILATED CARDIOMYOPATHY (MULTI): ICD-10-CM

## 2025-01-17 DIAGNOSIS — E78.2 MIXED HYPERLIPIDEMIA: ICD-10-CM

## 2025-01-17 RX ORDER — CARVEDILOL 25 MG/1
25 TABLET ORAL 2 TIMES DAILY
Qty: 60 TABLET | Refills: 2 | Status: SHIPPED | OUTPATIENT
Start: 2025-01-17

## 2025-01-17 RX ORDER — LOSARTAN POTASSIUM 100 MG/1
100 TABLET ORAL DAILY
Qty: 30 TABLET | Refills: 2 | Status: SHIPPED | OUTPATIENT
Start: 2025-01-17

## 2025-01-17 NOTE — TELEPHONE ENCOUNTER
Refills requested for all medicine, all not due pended ones due notified patient via voicemail that all were not due   Pended to [facility] in [place]

## 2025-01-20 DIAGNOSIS — E78.2 MIXED HYPERLIPIDEMIA: ICD-10-CM

## 2025-01-20 DIAGNOSIS — M85.852 OSTEOPENIA OF BOTH HIPS: ICD-10-CM

## 2025-01-20 DIAGNOSIS — F17.200 SMOKING ADDICTION: ICD-10-CM

## 2025-01-20 DIAGNOSIS — Z00.00 WELCOME TO MEDICARE PREVENTIVE VISIT: ICD-10-CM

## 2025-01-20 DIAGNOSIS — I50.22 CHRONIC SYSTOLIC CONGESTIVE HEART FAILURE: ICD-10-CM

## 2025-01-20 DIAGNOSIS — F17.200 NEEDS SMOKING CESSATION EDUCATION: ICD-10-CM

## 2025-01-20 DIAGNOSIS — J84.82 ADULT PULMONARY LANGERHANS CELL HISTIOCYTOSIS (MULTI): ICD-10-CM

## 2025-01-20 DIAGNOSIS — I42.0 DILATED CARDIOMYOPATHY (MULTI): ICD-10-CM

## 2025-01-20 DIAGNOSIS — I42.0 CARDIOMYOPATHY, DILATED, NONISCHEMIC (MULTI): ICD-10-CM

## 2025-01-20 DIAGNOSIS — M85.851 OSTEOPENIA OF BOTH HIPS: ICD-10-CM

## 2025-01-20 DIAGNOSIS — H60.8X3 CHRONIC ECZEMATOUS OTITIS EXTERNA OF BOTH EARS: ICD-10-CM

## 2025-01-20 DIAGNOSIS — J43.2 CENTRILOBULAR EMPHYSEMA (MULTI): ICD-10-CM

## 2025-01-20 RX ORDER — CARVEDILOL 25 MG/1
25 TABLET ORAL 2 TIMES DAILY
Qty: 180 TABLET | Refills: 3 | Status: SHIPPED | OUTPATIENT
Start: 2025-01-20 | End: 2025-01-22 | Stop reason: SDUPTHER

## 2025-01-20 RX ORDER — LOSARTAN POTASSIUM 100 MG/1
100 TABLET ORAL DAILY
Qty: 90 TABLET | Refills: 3 | Status: SHIPPED | OUTPATIENT
Start: 2025-01-20 | End: 2025-01-22 | Stop reason: SDUPTHER

## 2025-01-22 ENCOUNTER — APPOINTMENT (OUTPATIENT)
Dept: PRIMARY CARE | Facility: CLINIC | Age: 65
End: 2025-01-22
Payer: COMMERCIAL

## 2025-01-22 VITALS
BODY MASS INDEX: 24.07 KG/M2 | HEART RATE: 74 BPM | HEIGHT: 64 IN | DIASTOLIC BLOOD PRESSURE: 78 MMHG | WEIGHT: 141 LBS | SYSTOLIC BLOOD PRESSURE: 144 MMHG

## 2025-01-22 DIAGNOSIS — Z00.00 INITIAL MEDICARE ANNUAL WELLNESS VISIT: Primary | ICD-10-CM

## 2025-01-22 DIAGNOSIS — F14.11 COCAINE ABUSE IN REMISSION (MULTI): ICD-10-CM

## 2025-01-22 DIAGNOSIS — J84.82 ADULT PULMONARY LANGERHANS CELL HISTIOCYTOSIS (MULTI): ICD-10-CM

## 2025-01-22 DIAGNOSIS — J43.2 CENTRILOBULAR EMPHYSEMA (MULTI): ICD-10-CM

## 2025-01-22 DIAGNOSIS — I11.0 HYPERTENSIVE CARDIOMYOPATHY, WITH HEART FAILURE: ICD-10-CM

## 2025-01-22 DIAGNOSIS — E53.8 VITAMIN B12 DEFICIENCY: ICD-10-CM

## 2025-01-22 DIAGNOSIS — I43 HYPERTENSIVE CARDIOMYOPATHY, WITH HEART FAILURE: ICD-10-CM

## 2025-01-22 DIAGNOSIS — F17.200 TOBACCO DEPENDENCY: ICD-10-CM

## 2025-01-22 DIAGNOSIS — E55.9 VITAMIN D DEFICIENCY: ICD-10-CM

## 2025-01-22 DIAGNOSIS — F31.9 BIPOLAR 1 DISORDER (MULTI): ICD-10-CM

## 2025-01-22 DIAGNOSIS — I10 ESSENTIAL (PRIMARY) HYPERTENSION: ICD-10-CM

## 2025-01-22 DIAGNOSIS — Z00.00 ROUTINE GENERAL MEDICAL EXAMINATION AT HEALTH CARE FACILITY: ICD-10-CM

## 2025-01-22 DIAGNOSIS — I42.0 DILATED CARDIOMYOPATHY (MULTI): ICD-10-CM

## 2025-01-22 DIAGNOSIS — H60.8X3 CHRONIC ECZEMATOUS OTITIS EXTERNA OF BOTH EARS: ICD-10-CM

## 2025-01-22 DIAGNOSIS — F17.210 SMOKING GREATER THAN 40 PACK YEARS: ICD-10-CM

## 2025-01-22 PROBLEM — K74.69 COMPENSATED CIRRHOSIS RELATED TO HEPATITIS C VIRUS (HCV) (MULTI): Status: RESOLVED | Noted: 2024-05-07 | Resolved: 2025-01-22

## 2025-01-22 PROBLEM — J32.9 CHRONIC SINUSITIS WITH RECURRENT BRONCHITIS: Status: ACTIVE | Noted: 2025-01-22

## 2025-01-22 PROBLEM — J40 CHRONIC SINUSITIS WITH RECURRENT BRONCHITIS: Status: ACTIVE | Noted: 2025-01-22

## 2025-01-22 PROBLEM — B19.20 COMPENSATED CIRRHOSIS RELATED TO HEPATITIS C VIRUS (HCV) (MULTI): Status: RESOLVED | Noted: 2024-05-07 | Resolved: 2025-01-22

## 2025-01-22 PROCEDURE — 1159F MED LIST DOCD IN RCRD: CPT | Performed by: INTERNAL MEDICINE

## 2025-01-22 PROCEDURE — 1170F FXNL STATUS ASSESSED: CPT | Performed by: INTERNAL MEDICINE

## 2025-01-22 PROCEDURE — 3008F BODY MASS INDEX DOCD: CPT | Performed by: INTERNAL MEDICINE

## 2025-01-22 PROCEDURE — 1158F ADVNC CARE PLAN TLK DOCD: CPT | Performed by: INTERNAL MEDICINE

## 2025-01-22 PROCEDURE — 1160F RVW MEDS BY RX/DR IN RCRD: CPT | Performed by: INTERNAL MEDICINE

## 2025-01-22 PROCEDURE — 3077F SYST BP >= 140 MM HG: CPT | Performed by: INTERNAL MEDICINE

## 2025-01-22 PROCEDURE — 99214 OFFICE O/P EST MOD 30 MIN: CPT | Performed by: INTERNAL MEDICINE

## 2025-01-22 PROCEDURE — 3078F DIAST BP <80 MM HG: CPT | Performed by: INTERNAL MEDICINE

## 2025-01-22 PROCEDURE — 1123F ACP DISCUSS/DSCN MKR DOCD: CPT | Performed by: INTERNAL MEDICINE

## 2025-01-22 RX ORDER — ARIPIPRAZOLE 5 MG/1
5 TABLET ORAL NIGHTLY
Qty: 90 TABLET | Refills: 3 | Status: SHIPPED | OUTPATIENT
Start: 2025-01-22

## 2025-01-22 RX ORDER — UMECLIDINIUM BROMIDE AND VILANTEROL TRIFENATATE 62.5; 25 UG/1; UG/1
1 POWDER RESPIRATORY (INHALATION) DAILY
Qty: 90 EACH | Refills: 3 | Status: SHIPPED | OUTPATIENT
Start: 2025-01-22 | End: 2026-01-22

## 2025-01-22 RX ORDER — SPIRONOLACTONE AND HYDROCHLOROTHIAZIDE 25; 25 MG/1; MG/1
1 TABLET ORAL DAILY
Qty: 90 TABLET | Refills: 3 | Status: SHIPPED | OUTPATIENT
Start: 2025-01-22

## 2025-01-22 RX ORDER — NICOTINE POLACRILEX 4 MG/1
4 LOZENGE ORAL EVERY 2 HOUR PRN
Qty: 30 LOZENGE | Refills: 1 | Status: SHIPPED | OUTPATIENT
Start: 2025-01-22 | End: 2025-02-21

## 2025-01-22 RX ORDER — AMLODIPINE BESYLATE 5 MG/1
5 TABLET ORAL DAILY
Qty: 90 TABLET | Refills: 3 | Status: SHIPPED | OUTPATIENT
Start: 2025-01-22

## 2025-01-22 RX ORDER — ERGOCALCIFEROL 1.25 MG/1
50000 CAPSULE ORAL
Qty: 12 CAPSULE | Refills: 3 | Status: SHIPPED | OUTPATIENT
Start: 2025-01-26 | End: 2026-01-26

## 2025-01-22 RX ORDER — VIT C/E/ZN/COPPR/LUTEIN/ZEAXAN 250MG-90MG
1 CAPSULE ORAL DAILY
Qty: 90 TABLET | Refills: 3 | Status: SHIPPED | OUTPATIENT
Start: 2025-01-22 | End: 2026-01-22

## 2025-01-22 RX ORDER — DESONIDE 0.5 MG/G
CREAM TOPICAL 2 TIMES DAILY
Qty: 15 G | Refills: 3 | Status: SHIPPED | OUTPATIENT
Start: 2025-01-22 | End: 2025-01-23

## 2025-01-22 RX ORDER — LOSARTAN POTASSIUM 100 MG/1
100 TABLET ORAL DAILY
Qty: 90 TABLET | Refills: 3 | Status: SHIPPED | OUTPATIENT
Start: 2025-01-22 | End: 2026-01-22

## 2025-01-22 RX ORDER — ALBUTEROL SULFATE 90 UG/1
2 INHALANT RESPIRATORY (INHALATION) EVERY 6 HOURS PRN
Qty: 4 G | Refills: 3 | Status: SHIPPED | OUTPATIENT
Start: 2025-01-22 | End: 2026-01-22

## 2025-01-22 RX ORDER — CARVEDILOL 25 MG/1
25 TABLET ORAL 2 TIMES DAILY
Qty: 180 TABLET | Refills: 3 | Status: SHIPPED | OUTPATIENT
Start: 2025-01-22 | End: 2026-01-22

## 2025-01-22 RX ORDER — AMOXICILLIN AND CLAVULANATE POTASSIUM 875; 125 MG/1; MG/1
875 TABLET, FILM COATED ORAL 2 TIMES DAILY
Qty: 20 TABLET | Refills: 0 | Status: SHIPPED | OUTPATIENT
Start: 2025-01-22 | End: 2025-02-01

## 2025-01-22 ASSESSMENT — ENCOUNTER SYMPTOMS
DEPRESSION: 1
LOSS OF SENSATION IN FEET: 0
SINUS PAIN: 1
OCCASIONAL FEELINGS OF UNSTEADINESS: 0
SINUS PRESSURE: 1
RHINORRHEA: 1

## 2025-01-22 ASSESSMENT — ACTIVITIES OF DAILY LIVING (ADL)
DRESSING: INDEPENDENT
MANAGING_FINANCES: INDEPENDENT
DOING_HOUSEWORK: INDEPENDENT
TAKING_MEDICATION: INDEPENDENT
GROCERY_SHOPPING: INDEPENDENT
BATHING: INDEPENDENT

## 2025-01-22 ASSESSMENT — PATIENT HEALTH QUESTIONNAIRE - PHQ9
SUM OF ALL RESPONSES TO PHQ9 QUESTIONS 1 AND 2: 1
1. LITTLE INTEREST OR PLEASURE IN DOING THINGS: NOT AT ALL
2. FEELING DOWN, DEPRESSED OR HOPELESS: SEVERAL DAYS

## 2025-01-22 ASSESSMENT — COLUMBIA-SUICIDE SEVERITY RATING SCALE - C-SSRS
1. IN THE PAST MONTH, HAVE YOU WISHED YOU WERE DEAD OR WISHED YOU COULD GO TO SLEEP AND NOT WAKE UP?: NO
2. HAVE YOU ACTUALLY HAD ANY THOUGHTS OF KILLING YOURSELF?: NO
6. HAVE YOU EVER DONE ANYTHING, STARTED TO DO ANYTHING, OR PREPARED TO DO ANYTHING TO END YOUR LIFE?: NO

## 2025-01-22 NOTE — ASSESSMENT & PLAN NOTE
Patient close to quitting at this time  Orders:    losartan (Cozaar) 100 mg tablet; Take 1 tablet (100 mg) by mouth once daily.    umeclidinium-vilanteroL (Anoro Ellipta) 62.5-25 mcg/actuation blister with device; Inhale 1 puff once daily.    CT lung screening low dose; Future

## 2025-01-22 NOTE — ASSESSMENT & PLAN NOTE
Well compensated continue to monitor closely reevaluate with blood work  Orders:    ARIPiprazole (Abilify) 5 mg tablet; Take 1 tablet (5 mg) by mouth once daily at bedtime.    spironolacton-hydrochlorothiaz (Aldactazide) 25-25 mg tablet; Take 1 tablet (25 mg) by mouth once daily.    Follow Up In [facility] - PCP - Established; Future

## 2025-01-22 NOTE — ASSESSMENT & PLAN NOTE
Continue with Anoro Ellipta with albuterol patient is quit smoking  Orders:    losartan (Cozaar) 100 mg tablet; Take 1 tablet (100 mg) by mouth once daily.    ARIPiprazole (Abilify) 5 mg tablet; Take 1 tablet (5 mg) by mouth once daily at bedtime.    albuterol 90 mcg/actuation inhaler; Inhale 2 puffs every 6 hours if needed for wheezing.    umeclidinium-vilanteroL (Anoro Ellipta) 62.5-25 mcg/actuation blister with device; Inhale 1 puff once daily.

## 2025-01-22 NOTE — PATIENT INSTRUCTIONS
Quitting Smoking    Quitting smoking is the most important step you can take to improve your health. We're glad you have set a goal to improve your health.    Quit Smoking Resources    In addition to medications, use the STAR plan to help you successfully quit.   Stick with your quit date!   Tell friends, family, and coworkers your quit date. Request their understanding and support.  Anticipate and prepare for challenges. Some examples are withdrawal symptoms, being around others who smoke, and drinking alcohol.  Remove all tobacco products and paraphernalia from your environment. Make your home and vehicles smoke-free.    Free resources for additional support:  National tobacco quitline: 1-800-QUIT-NOW (1-103.306.5893).  SmokefreeTXT is a free text program to assist you in quitting. Visit https://www.smokefree.gov/smokefreetxt for more information.  Feel free to call your care manager at ([phone]) for additional support.

## 2025-01-22 NOTE — ASSESSMENT & PLAN NOTE
Resume 50,000 units vitamin D2 weekly reevaluate  Orders:    ergocalciferol (Vitamin D-2) 1.25 MG (44046 UT) capsule; Take 1 capsule (50,000 Units) by mouth 1 (one) time per week.

## 2025-01-22 NOTE — PROGRESS NOTES
"Subjective   Reason for Visit: Danielle Dickinson is an 65 y.o. female here for a Medicare Wellness visit.     Past Medical, Surgical, and Family History reviewed and updated in chart.    Reviewed all medications by prescribing practitioner or clinical pharmacist (such as prescriptions, OTCs, herbal therapies and supplements) and documented in the medical record.    HPI    Patient Care Team:  Nelson Leavitt DO as PCP - General     Review of Systems   HENT:  Positive for congestion, ear discharge, rhinorrhea, sinus pressure and sinus pain.        Objective   Vitals:  /78 (BP Location: Left arm)   Pulse 74   Ht 1.626 m (5' 4\")   Wt 64 kg (141 lb)   BMI 24.20 kg/m²       Physical Exam  Vitals and nursing note reviewed.   Constitutional:       General: She is not in acute distress.     Appearance: Normal appearance. She is well-developed. She is not toxic-appearing.   HENT:      Head: Normocephalic and atraumatic.      Right Ear: Tympanic membrane and external ear normal.      Left Ear: Tympanic membrane and external ear normal. There is impacted cerumen.      Nose: Nose normal.      Mouth/Throat:      Mouth: Mucous membranes are moist.      Pharynx: Oropharynx is clear. No oropharyngeal exudate or posterior oropharyngeal erythema.      Tonsils: No tonsillar exudate. 2+ on the right. 2+ on the left.   Eyes:      Extraocular Movements: Extraocular movements intact.      Conjunctiva/sclera: Conjunctivae normal.   Cardiovascular:      Rate and Rhythm: Normal rate and regular rhythm.      Pulses: Normal pulses.      Heart sounds: Normal heart sounds. No murmur heard.  Pulmonary:      Effort: Pulmonary effort is normal.      Breath sounds: Normal breath sounds.   Abdominal:      General: Abdomen is flat. Bowel sounds are normal.      Palpations: Abdomen is soft.   Musculoskeletal:      Cervical back: Neck supple.   Lymphadenopathy:      Cervical: No cervical adenopathy.   Skin:     General: Skin is warm and dry.    "   Findings: No rash.   Neurological:      Mental Status: She is alert. Mental status is at baseline.   Psychiatric:         Mood and Affect: Mood normal.         Behavior: Behavior normal.         Thought Content: Thought content normal.         Judgment: Judgment normal.     Lifestyle Recommendations  I recommend a whole-food plant-based diet, an eating pattern that encourages the consumption of unrefined plant foods (such as fruits, vegetables, tubers, whole grains, legumes, nuts and seeds) and discourages meats, dairy products, eggs and processed foods.     The AHA/ACC recommends that the patient consume a dietary pattern that emphasizes intake of vegetables, fruits, and whole grains; includes low-fat dairy products, poultry, fish, legumes, non-tropical vegetable oils, and nuts; and limits intake of sodium, sweets, sugar-sweetened beverages, and red meats.  Adapt this dietary pattern to appropriate calorie requirements (a 500-750 kcal/day deficit to loose weight), personal and cultural food preferences, and nutrition therapy for other medical conditions (including diabetes).  Achieve this pattern by following plans such as the [place] Mediterranean, DASH dietary pattern, or AHA diet.     Engage in 2 hours and 30 minutes per week of moderate-intensity physical activity, or 1 hour and 15 minutes (75 minutes) per week of vigorous-intensity aerobic physical activity, or an equivalent combination of moderate and vigorous-intensity aerobic physical activity. Aerobic activity should be performed in episodes of at least 10 minutes preferably spread throughout the week.     Adhering to a heart healthy diet, regular exercise habits, avoidance of tobacco products, and maintenance of a healthy weight are crucial components of their heart disease risk reduction.    Assessment & Plan  Adult pulmonary Langerhans cell histiocytosis (Multi)  Reevaluate with chest CT for lung cancer screening  Orders:    losartan (Cozaar) 100 mg  tablet; Take 1 tablet (100 mg) by mouth once daily.    ARIPiprazole (Abilify) 5 mg tablet; Take 1 tablet (5 mg) by mouth once daily at bedtime.    umeclidinium-vilanteroL (Anoro Ellipta) 62.5-25 mcg/actuation blister with device; Inhale 1 puff once daily.    amoxicillin-pot clavulanate (Augmentin) 875-125 mg tablet; Take 1 tablet (875 mg) by mouth 2 times a day for 10 days.    Referral to ENT; Future    Follow Up In [facility] - PCP - Established; Future    Dilated cardiomyopathy (Multi)  Well compensated refilled carvedilol 25 mg twice a day  Orders:    carvedilol (Coreg) 25 mg tablet; Take 1 tablet (25 mg) by mouth 2 times a day.    Centrilobular emphysema (Multi)  Continue with Anoro Ellipta with albuterol patient is quit smoking  Orders:    losartan (Cozaar) 100 mg tablet; Take 1 tablet (100 mg) by mouth once daily.    ARIPiprazole (Abilify) 5 mg tablet; Take 1 tablet (5 mg) by mouth once daily at bedtime.    albuterol 90 mcg/actuation inhaler; Inhale 2 puffs every 6 hours if needed for wheezing.    umeclidinium-vilanteroL (Anoro Ellipta) 62.5-25 mcg/actuation blister with device; Inhale 1 puff once daily.    Welcome to Medicare preventive visit  Discussed advanced medical directives.  Orders:    losartan (Cozaar) 100 mg tablet; Take 1 tablet (100 mg) by mouth once daily.    Cardiomyopathy, dilated, nonischemic (Multi)  Well compensated on losartan and carvedilol  Orders:    losartan (Cozaar) 100 mg tablet; Take 1 tablet (100 mg) by mouth once daily.    ARIPiprazole (Abilify) 5 mg tablet; Take 1 tablet (5 mg) by mouth once daily at bedtime.    umeclidinium-vilanteroL (Anoro Ellipta) 62.5-25 mcg/actuation blister with device; Inhale 1 puff once daily.    Chronic systolic congestive heart failure  Well compensated continue carvedilol 25 mg twice a day losartan 100 mg daily with amlodipine 5 mg daily and spironolactone hydrochlorothiazide 25/25 1 tablet daily  Orders:    losartan (Cozaar) 100 mg tablet;  Take 1 tablet (100 mg) by mouth once daily.    ARIPiprazole (Abilify) 5 mg tablet; Take 1 tablet (5 mg) by mouth once daily at bedtime.    Osteopenia of both hips  Continue to monitor consider alendronate  Orders:    losartan (Cozaar) 100 mg tablet; Take 1 tablet (100 mg) by mouth once daily.    ARIPiprazole (Abilify) 5 mg tablet; Take 1 tablet (5 mg) by mouth once daily at bedtime.    umeclidinium-vilanteroL (Anoro Ellipta) 62.5-25 mcg/actuation blister with device; Inhale 1 puff once daily.    Smoking addiction  Has quit smoking over 1 week reevaluate with CT lung cancer screening encouragement  Orders:    losartan (Cozaar) 100 mg tablet; Take 1 tablet (100 mg) by mouth once daily.    CT lung screening low dose; Future    Mixed hyperlipidemia  Reevaluate lipid profile continue rosuvastatin 10 mg daily LDL goal less than 100  Orders:    losartan (Cozaar) 100 mg tablet; Take 1 tablet (100 mg) by mouth once daily.    Needs smoking cessation education  Patient close to quitting at this time  Orders:    losartan (Cozaar) 100 mg tablet; Take 1 tablet (100 mg) by mouth once daily.    umeclidinium-vilanteroL (Anoro Ellipta) 62.5-25 mcg/actuation blister with device; Inhale 1 puff once daily.    CT lung screening low dose; Future    Chronic eczematous otitis externa of both ears  Prescribed desonide ointment apply 2 times to affected areas outside years reevaluate  Orders:    losartan (Cozaar) 100 mg tablet; Take 1 tablet (100 mg) by mouth once daily.    desonide (DesOwen) 0.05 % cream; Apply topically 2 times a day. Apply to affected areas    Bipolar 1 disorder (Multi)  Continue Abilify stable at this time  Orders:    ARIPiprazole (Abilify) 5 mg tablet; Take 1 tablet (5 mg) by mouth once daily at bedtime.    Hypertensive cardiomyopathy, with heart failure  Well compensated continue to monitor closely reevaluate with blood work  Orders:    ARIPiprazole (Abilify) 5 mg tablet; Take 1 tablet (5 mg) by mouth once daily at  bedtime.    spironolacton-hydrochlorothiaz (Aldactazide) 25-25 mg tablet; Take 1 tablet (25 mg) by mouth once daily.    Follow Up In [facility] - PCP - Established; Future    Essential (primary) hypertension  Reevaluate refilled amlodipine carvedilol and losartan with hydrochlorothiazide spironolactone reevaluate blood work  Orders:    amLODIPine (Norvasc) 5 mg tablet; Take 1 tablet (5 mg) by mouth once daily.    Vitamin B12 deficiency  Continue vitamin B12 supplementation  Orders:    cyanocobalamin, vitamin B-12, (Vitamin B-12) 1,000 mcg tablet extended release; Take 1 tablet (1,000 mcg) by mouth once daily.    Vitamin D deficiency  Resume 50,000 units vitamin D2 weekly reevaluate  Orders:    ergocalciferol (Vitamin D-2) 1.25 MG (50227 UT) capsule; Take 1 capsule (50,000 Units) by mouth 1 (one) time per week.    Initial Medicare annual wellness visit  Follow-up plan in 3 months up-to-date with surveillance endoscopy up-to-date with Cologuard screening for colon cancer surveillance mammogram diagnostic scheduled for 6-month follow-up from December       Cocaine abuse in remission (Multi)  Stable no further drug use at this time       Chronic sinusitis with recurrent bronchitis  Start trial amoxicillin/clavulanate 875/125 for persistent chronic sinusitis referral to ENT for further evaluation  Orders:    amoxicillin-pot clavulanate (Augmentin) 875-125 mg tablet; Take 1 tablet (875 mg) by mouth 2 times a day for 10 days.    Referral to ENT; Future    Smoking greater than 40 pack years  Work on smoking cessation started quitting over 1 week ago to initiate nicotine patch and lozenges reevaluate next visit  Orders:    CT lung screening low dose; Future    Routine general medical examination at health care facility  As noted above  Orders:    1 Year Follow Up In [facility] - PCP - Wellness Exam; Future    Tobacco dependency  Continue with nicotine lozenge  Orders:    nicotine polacrilex (Commit) 4  mg lozenge; Use 1 lozenge (4 mg) in the mouth or throat every 2 hours if needed for smoking cessation.           Advance Directives Discussion  16 - 20 minutes were spent discussing Advanced Care Planning (including a Living Will, Medical Power Of , as well as specific end of life choices and/or directives). The details of that discussion were documented in Advanced Directives Discussion section of the medical record.     Cardiac Risk Assessment  15 - 20 minutes were spent discussing Cardiovascular risk and, if needed, lifestyle modifications were recommended, including nutritional choices, exercise, and elimination of habits contributing to risk.   Aspirin use/disuse was discussed following the guidelines below:  low dose ASA ( mg) should be considered:    If prior Heart Attack/Stroke/Peripheral vascular disease:  Generally recommend daily low dose aspirin unless extremely high bleeding risk (e.g., gastrointestinal).    If no prior Heart Attack/Stroke/Peripheral vascular disease:              Age over 70: Do not use Aspirin for prevention    Age less than 70 and 10-year cardiovascular disease risk is >20%: use low dose Aspirin for prevention.                 Depression Screening  5 - 10 minutes were spent screening for depression.    Low Dose CT Screening  We discussed the pros and cons of low dose CT screening for lung cancer based on the patient's smoking history.  This screening is recommended for patients who:  Are between the age of 50 to 77  Have a 20 pack-year smoking history (20 years of smoking a pack a day)  Are either a current smoker or have quit smoking within the last 15 years  Are in good health - no new cough or unexplained weight loss  Are willing to do the follow-up testing and treatment, if needed  Have not had a chest CT (CAT) scan in the last year    Tobacco Counseling  3 - 5 minutes were spent counseling the patient on tobacco cessation.  Benefits of cessation were discussed as  well as techniques to help quit.    Tobacco Counseling  The patient smokes cigarettes and desires to quit.  We created the following quit plan:  Reviewed STAR protocol and added to patient instructions.

## 2025-01-22 NOTE — ASSESSMENT & PLAN NOTE
Continue to monitor consider alendronate  Orders:    losartan (Cozaar) 100 mg tablet; Take 1 tablet (100 mg) by mouth once daily.    ARIPiprazole (Abilify) 5 mg tablet; Take 1 tablet (5 mg) by mouth once daily at bedtime.    umeclidinium-vilanteroL (Anoro Ellipta) 62.5-25 mcg/actuation blister with device; Inhale 1 puff once daily.

## 2025-01-22 NOTE — ASSESSMENT & PLAN NOTE
Continue vitamin B12 supplementation  Orders:    cyanocobalamin, vitamin B-12, (Vitamin B-12) 1,000 mcg tablet extended release; Take 1 tablet (1,000 mcg) by mouth once daily.

## 2025-01-22 NOTE — ASSESSMENT & PLAN NOTE
Prescribed desonide ointment apply 2 times to affected areas outside years reevaluate  Orders:    losartan (Cozaar) 100 mg tablet; Take 1 tablet (100 mg) by mouth once daily.    desonide (DesOwen) 0.05 % cream; Apply topically 2 times a day. Apply to affected areas

## 2025-01-22 NOTE — ASSESSMENT & PLAN NOTE
Well compensated on losartan and carvedilol  Orders:    losartan (Cozaar) 100 mg tablet; Take 1 tablet (100 mg) by mouth once daily.    ARIPiprazole (Abilify) 5 mg tablet; Take 1 tablet (5 mg) by mouth once daily at bedtime.    umeclidinium-vilanteroL (Anoro Ellipta) 62.5-25 mcg/actuation blister with device; Inhale 1 puff once daily.

## 2025-01-22 NOTE — ASSESSMENT & PLAN NOTE
Continue Abilify stable at this time  Orders:    ARIPiprazole (Abilify) 5 mg tablet; Take 1 tablet (5 mg) by mouth once daily at bedtime.

## 2025-01-22 NOTE — ASSESSMENT & PLAN NOTE
Reevaluate with chest CT for lung cancer screening  Orders:    losartan (Cozaar) 100 mg tablet; Take 1 tablet (100 mg) by mouth once daily.    ARIPiprazole (Abilify) 5 mg tablet; Take 1 tablet (5 mg) by mouth once daily at bedtime.    umeclidinium-vilanteroL (Anoro Ellipta) 62.5-25 mcg/actuation blister with device; Inhale 1 puff once daily.    amoxicillin-pot clavulanate (Augmentin) 875-125 mg tablet; Take 1 tablet (875 mg) by mouth 2 times a day for 10 days.    Referral to ENT; Future    Follow [facility] - PCP - Established; Future

## 2025-01-22 NOTE — ASSESSMENT & PLAN NOTE
Reevaluate lipid profile continue rosuvastatin 10 mg daily LDL goal less than 100  Orders:    losartan (Cozaar) 100 mg tablet; Take 1 tablet (100 mg) by mouth once daily.

## 2025-01-23 ENCOUNTER — TELEPHONE (OUTPATIENT)
Dept: PRIMARY CARE | Facility: CLINIC | Age: 65
End: 2025-01-23
Payer: COMMERCIAL

## 2025-01-23 DIAGNOSIS — H60.8X3 CHRONIC ECZEMATOUS OTITIS EXTERNA OF BOTH EARS: Primary | ICD-10-CM

## 2025-01-23 RX ORDER — MOMETASONE FUROATE 1 MG/G
OINTMENT TOPICAL DAILY
Qty: 15 G | Refills: 0 | Status: SHIPPED | OUTPATIENT
Start: 2025-01-23 | End: 2025-02-06

## 2025-01-23 NOTE — ASSESSMENT & PLAN NOTE
Work on smoking cessation started quitting over 1 week ago to initiate nicotine patch and lozenges reevaluate next visit  Orders:    CT lung screening low dose; Future

## 2025-01-23 NOTE — ASSESSMENT & PLAN NOTE
Start trial amoxicillin/clavulanate 875/125 for persistent chronic sinusitis referral to ENT for further evaluation  Orders:    amoxicillin-pot clavulanate (Augmentin) 875-125 mg tablet; Take 1 tablet (875 mg) by mouth 2 times a day for 10 days.    Referral to ENT; Future

## 2025-01-23 NOTE — ASSESSMENT & PLAN NOTE
Reevaluate refilled amlodipine carvedilol and losartan with hydrochlorothiazide spironolactone reevaluate blood work  Orders:    amLODIPine (Norvasc) 5 mg tablet; Take 1 tablet (5 mg) by mouth once daily.

## 2025-01-23 NOTE — TELEPHONE ENCOUNTER
Insurance will not cover the Desonide 0.05%cream   They will cover   Hydrocortisone cream  Mometasone cream  Triamcinolone cream  Alclometasone cream  Betamethasone cream  Asking to have one of these sent to the pharmacy

## 2025-01-23 NOTE — ASSESSMENT & PLAN NOTE
As noted above  Orders:    1 Year Follow Up In [facility] - PCP - Wellness Exam; Future

## 2025-01-23 NOTE — ASSESSMENT & PLAN NOTE
Continue with nicotine lozenge  Orders:    nicotine polacrilex (Commit) 4 mg lozenge; Use 1 lozenge (4 mg) in the mouth or throat every 2 hours if needed for smoking cessation.

## 2025-01-23 NOTE — ASSESSMENT & PLAN NOTE
Follow-up plan in 3 months up-to-date with surveillance endoscopy up-to-date with Cologuard screening for colon cancer surveillance mammogram diagnostic scheduled for 6-month follow-up from December

## 2025-04-15 DIAGNOSIS — J43.2 CENTRILOBULAR EMPHYSEMA (MULTI): ICD-10-CM

## 2025-04-15 DIAGNOSIS — J84.82 ADULT PULMONARY LANGERHANS CELL HISTIOCYTOSIS (MULTI): ICD-10-CM

## 2025-04-15 RX ORDER — UMECLIDINIUM BROMIDE AND VILANTEROL TRIFENATATE 62.5; 25 UG/1; UG/1
1 POWDER RESPIRATORY (INHALATION) DAILY
Qty: 180 EACH | Refills: 3 | Status: SHIPPED | OUTPATIENT
Start: 2025-04-15

## 2025-04-17 DIAGNOSIS — E78.2 MIXED HYPERLIPIDEMIA: ICD-10-CM

## 2025-04-17 DIAGNOSIS — M17.11 PRIMARY OSTEOARTHRITIS OF RIGHT KNEE: ICD-10-CM

## 2025-04-17 DIAGNOSIS — F31.9 BIPOLAR 1 DISORDER (MULTI): ICD-10-CM

## 2025-04-17 RX ORDER — OXCARBAZEPINE 600 MG/1
600 TABLET, FILM COATED ORAL 2 TIMES DAILY
Qty: 180 TABLET | Refills: 3 | Status: SHIPPED | OUTPATIENT
Start: 2025-04-17 | End: 2026-04-17

## 2025-04-17 RX ORDER — ROSUVASTATIN CALCIUM 10 MG/1
10 TABLET, COATED ORAL NIGHTLY
Qty: 90 TABLET | Refills: 3 | Status: SHIPPED | OUTPATIENT
Start: 2025-04-17 | End: 2026-04-17

## 2025-04-29 ENCOUNTER — APPOINTMENT (OUTPATIENT)
Dept: PRIMARY CARE | Facility: CLINIC | Age: 65
End: 2025-04-29
Payer: COMMERCIAL

## 2025-06-19 ENCOUNTER — HOSPITAL ENCOUNTER (OUTPATIENT)
Dept: RADIOLOGY | Facility: HOSPITAL | Age: 65
Discharge: HOME | End: 2025-06-19
Payer: COMMERCIAL

## 2025-06-19 DIAGNOSIS — R92.8 ABNORMALITY OF LEFT BREAST ON SCREENING MAMMOGRAM: ICD-10-CM

## 2025-06-19 DIAGNOSIS — R92.8 ABNORMALITY OF LEFT BREAST ON SCREENING MAMMOGRAM: Primary | ICD-10-CM

## 2025-06-19 PROCEDURE — 77061 BREAST TOMOSYNTHESIS UNI: CPT | Mod: LT

## 2025-06-19 PROCEDURE — 76642 ULTRASOUND BREAST LIMITED: CPT | Mod: LT

## 2025-06-19 PROCEDURE — G0279 TOMOSYNTHESIS, MAMMO: HCPCS | Mod: LEFT SIDE | Performed by: RADIOLOGY

## 2025-06-19 PROCEDURE — 77065 DX MAMMO INCL CAD UNI: CPT | Mod: LEFT SIDE | Performed by: RADIOLOGY

## 2025-06-19 PROCEDURE — 76982 USE 1ST TARGET LESION: CPT | Mod: LT

## 2025-06-19 PROCEDURE — 76642 ULTRASOUND BREAST LIMITED: CPT | Mod: LEFT SIDE | Performed by: RADIOLOGY

## 2025-06-20 ENCOUNTER — APPOINTMENT (OUTPATIENT)
Dept: PRIMARY CARE | Facility: CLINIC | Age: 65
End: 2025-06-20
Payer: COMMERCIAL

## 2025-06-20 VITALS
BODY MASS INDEX: 22.67 KG/M2 | SYSTOLIC BLOOD PRESSURE: 138 MMHG | HEIGHT: 64 IN | OXYGEN SATURATION: 98 % | HEART RATE: 67 BPM | WEIGHT: 132.8 LBS | DIASTOLIC BLOOD PRESSURE: 80 MMHG

## 2025-06-20 DIAGNOSIS — J43.2 CENTRILOBULAR EMPHYSEMA (MULTI): ICD-10-CM

## 2025-06-20 DIAGNOSIS — E53.8 VITAMIN B12 DEFICIENCY: ICD-10-CM

## 2025-06-20 DIAGNOSIS — M85.852 OSTEOPENIA OF BOTH HIPS: ICD-10-CM

## 2025-06-20 DIAGNOSIS — M85.851 OSTEOPENIA OF BOTH HIPS: ICD-10-CM

## 2025-06-20 DIAGNOSIS — M17.11 PRIMARY OSTEOARTHRITIS OF RIGHT KNEE: ICD-10-CM

## 2025-06-20 DIAGNOSIS — F17.200 NEEDS SMOKING CESSATION EDUCATION: ICD-10-CM

## 2025-06-20 DIAGNOSIS — I11.0 HYPERTENSIVE CARDIOMYOPATHY, WITH HEART FAILURE: Primary | ICD-10-CM

## 2025-06-20 DIAGNOSIS — R73.02 IGT (IMPAIRED GLUCOSE TOLERANCE): ICD-10-CM

## 2025-06-20 DIAGNOSIS — I43 HYPERTENSIVE CARDIOMYOPATHY, WITH HEART FAILURE: Primary | ICD-10-CM

## 2025-06-20 DIAGNOSIS — J84.82 ADULT PULMONARY LANGERHANS CELL HISTIOCYTOSIS (MULTI): ICD-10-CM

## 2025-06-20 DIAGNOSIS — F17.200 NICOTINE DEPENDENCE WITH CURRENT USE: ICD-10-CM

## 2025-06-20 DIAGNOSIS — F31.9 BIPOLAR 1 DISORDER (MULTI): ICD-10-CM

## 2025-06-20 DIAGNOSIS — I42.0 CARDIOMYOPATHY, DILATED, NONISCHEMIC (MULTI): ICD-10-CM

## 2025-06-20 DIAGNOSIS — E55.9 VITAMIN D DEFICIENCY: ICD-10-CM

## 2025-06-20 PROBLEM — I10 ESSENTIAL (PRIMARY) HYPERTENSION: Status: RESOLVED | Noted: 2025-01-22 | Resolved: 2025-06-20

## 2025-06-20 RX ORDER — ERGOCALCIFEROL 1.25 MG/1
50000 CAPSULE ORAL
Qty: 12 CAPSULE | Refills: 3 | Status: SHIPPED | OUTPATIENT
Start: 2025-06-22 | End: 2026-06-22

## 2025-06-20 RX ORDER — OXCARBAZEPINE 600 MG/1
600 TABLET, FILM COATED ORAL 2 TIMES DAILY
Qty: 180 TABLET | Refills: 3 | Status: SHIPPED | OUTPATIENT
Start: 2025-06-20 | End: 2026-06-20

## 2025-06-20 RX ORDER — BENZTROPINE MESYLATE 1 MG/1
1 TABLET ORAL DAILY
Qty: 90 TABLET | Refills: 3 | Status: SHIPPED | OUTPATIENT
Start: 2025-06-20 | End: 2026-06-20

## 2025-06-20 RX ORDER — VIT C/E/ZN/COPPR/LUTEIN/ZEAXAN 250MG-90MG
1 CAPSULE ORAL DAILY
Qty: 90 TABLET | Refills: 3 | Status: SHIPPED | OUTPATIENT
Start: 2025-06-20 | End: 2026-06-20

## 2025-06-20 ASSESSMENT — PATIENT HEALTH QUESTIONNAIRE - PHQ9
SUM OF ALL RESPONSES TO PHQ9 QUESTIONS 1 AND 2: 0
2. FEELING DOWN, DEPRESSED OR HOPELESS: NOT AT ALL
1. LITTLE INTEREST OR PLEASURE IN DOING THINGS: NOT AT ALL

## 2025-06-20 ASSESSMENT — ENCOUNTER SYMPTOMS
LOSS OF SENSATION IN FEET: 0
DEPRESSION: 0
OCCASIONAL FEELINGS OF UNSTEADINESS: 0

## 2025-06-20 NOTE — ASSESSMENT & PLAN NOTE
Well compensated at this time on carvedilol with losartan at maximal dose reevaluate blood work  Orders:    Follow [facility] - PCP - Established; Future

## 2025-06-20 NOTE — PATIENT INSTRUCTIONS
Quitting Smoking    Quitting smoking is the most important step you can take to improve your health. We're glad you have set a goal to improve your health.    Quit Smoking Resources    In addition to medications, use the STAR plan to help you successfully quit.   Stick with your quit date!   Tell friends, family, and coworkers your quit date. Request their understanding and support.  Anticipate and prepare for challenges. Some examples are withdrawal symptoms, being around others who smoke, and drinking alcohol.  Remove all tobacco products and paraphernalia from your environment. Make your home and vehicles smoke-free.    Free resources for additional support:  National tobacco quitline: 1-800-QUIT-NOW (1-761.389.8987).  SmokefreeTXT is a free text program to assist you in quitting. Visit https://www.smokefree.gov/smokefreetxt for more information.

## 2025-06-20 NOTE — ASSESSMENT & PLAN NOTE
No exacerbations continue on Anoro Ellipta work on smoking cessation as smoking 40 cigarettes a week  Orders:    Referral to [facility]; Future    [facility] - PCP - Established; Future    CBC and Auto Differential; Future    Comprehensive Metabolic Panel; Future    Hemoglobin A1C; Future    Lipid Panel; Future    Albumin-Creatinine Ratio, Urine Random; Future    Referral to Psychiatry; Future    Vitamin D 25-Hydroxy,Total (for eval of Vitamin D levels); Future    benztropine (Cogentin) 1 mg tablet; Take 1 tablet (1 mg) by mouth once daily.

## 2025-06-20 NOTE — ASSESSMENT & PLAN NOTE
Reevaluate with lab work refill today  Orders:    cyanocobalamin, vitamin B-12, (Vitamin B-12) 1,000 mcg tablet extended release; Take 1 tablet (1,000 mcg) by mouth once daily.    Referral to [facility]; Future    Follow Up [facility] - PCP - Established; Future    CBC and Auto Differential; Future    Comprehensive Metabolic Panel; Future    Hemoglobin A1C; Future    Lipid Panel; Future    Albumin-Creatinine Ratio, Urine Random; Future    Referral to Psychiatry; Future    Vitamin D 25-Hydroxy,Total (for eval of Vitamin D levels); Future    benztropine (Cogentin) 1 mg tablet; Take 1 tablet (1 mg) by mouth once daily.

## 2025-06-20 NOTE — PROGRESS NOTES
"Subjective   Reason for Visit: Danielle Dickinson is an 65 y.o. female here for a  fu visit.     Past Medical, Surgical, and Family History reviewed and updated in chart.    Reviewed all medications by prescribing practitioner or clinical pharmacist (such as prescriptions, OTCs, herbal therapies and supplements) and documented in the medical record.    HPI    Patient Care Team:  Nelson Leavitt DO as PCP - General     Review of Systems   All other systems reviewed and are negative.      Objective   Vitals:  /80   Pulse 67   Ht 1.626 m (5' 4\")   Wt 60.2 kg (132 lb 12.8 oz)   SpO2 98%   BMI 22.80 kg/m²       Physical Exam  Vitals and nursing note reviewed.   Constitutional:       General: She is not in acute distress.     Appearance: Normal appearance. She is well-developed. She is not toxic-appearing.   HENT:      Head: Normocephalic and atraumatic.      Right Ear: Tympanic membrane and external ear normal.      Left Ear: Tympanic membrane and external ear normal.      Nose: Nose normal.      Mouth/Throat:      Mouth: Mucous membranes are moist.      Pharynx: Oropharynx is clear. No oropharyngeal exudate or posterior oropharyngeal erythema.      Tonsils: No tonsillar exudate. 2+ on the right. 2+ on the left.   Eyes:      Extraocular Movements: Extraocular movements intact.      Conjunctiva/sclera: Conjunctivae normal.   Cardiovascular:      Rate and Rhythm: Normal rate and regular rhythm.      Pulses: Normal pulses.      Heart sounds: Normal heart sounds. No murmur heard.  Pulmonary:      Effort: Pulmonary effort is normal.      Breath sounds: Normal breath sounds.   Abdominal:      General: Abdomen is flat. Bowel sounds are normal.      Palpations: Abdomen is soft.   Musculoskeletal:      Cervical back: Neck supple.   Lymphadenopathy:      Cervical: No cervical adenopathy.   Skin:     General: Skin is warm and dry.      Findings: No rash.   Neurological:      Mental Status: She is alert. Mental status is " at baseline.   Psychiatric:         Mood and Affect: Mood normal.         Behavior: Behavior normal.         Thought Content: Thought content normal.         Judgment: Judgment normal.         Assessment & Plan  Vitamin D deficiency  Reevaluate with lab work  Orders:    ergocalciferol (Vitamin D-2) 1250 mcg (50,000 units) capsule; Take 1 capsule (50,000 Units) by mouth 1 (one) time per week.    Referral to [facility]; Future    Follow Up [facility] - PCP - Established; Future    CBC and Auto Differential; Future    Comprehensive Metabolic Panel; Future    Hemoglobin A1C; Future    Lipid Panel; Future    Albumin-Creatinine Ratio, Urine Random; Future    Referral to Psychiatry; Future    Vitamin D 25-Hydroxy,Total (for eval of Vitamin D levels); Future    benztropine (Cogentin) 1 mg tablet; Take 1 tablet (1 mg) by mouth once daily.    Vitamin B12 deficiency  Reevaluate with lab work refill today  Orders:    cyanocobalamin, vitamin B-12, (Vitamin B-12) 1,000 mcg tablet extended release; Take 1 tablet (1,000 mcg) by mouth once daily.    Referral to [facility]; Future    Follow Up [facility] - PCP - Established; Future    CBC and Auto Differential; Future    Comprehensive Metabolic Panel; Future    Hemoglobin A1C; Future    Lipid Panel; Future    Albumin-Creatinine Ratio, Urine Random; Future    Referral to Psychiatry; Future    Vitamin D 25-Hydroxy,Total (for eval of Vitamin D levels); Future    benztropine (Cogentin) 1 mg tablet; Take 1 tablet (1 mg) by mouth once daily.    Adult pulmonary Langerhans cell histiocytosis (Multi)  Stable symptoms continue work on smoking cessation       Centrilobular emphysema (Multi)  No exacerbations continue on Anoro Ellipta work on smoking cessation as smoking 40 cigarettes a week  Orders:    Referral to [facility]; Future    Follow Up [facility] - PCP - Established; Future    CBC and Auto Differential; Future    Comprehensive  Metabolic Panel; Future    Hemoglobin A1C; Future    Lipid Panel; Future    Albumin-Creatinine Ratio, Urine Random; Future    Referral to Psychiatry; Future    Vitamin D 25-Hydroxy,Total (for eval of Vitamin D levels); Future    benztropine (Cogentin) 1 mg tablet; Take 1 tablet (1 mg) by mouth once daily.    Cardiomyopathy, dilated, nonischemic (Multi)  Well compensated at this time on carvedilol with losartan at maximal dose reevaluate blood work  Orders:    Follow [facility] - PCP - Established; Future    Bipolar 1 disorder (Multi)  Patient with rapid speech not sleeping well appears hypomanic.  Refill carbamazepine and increase Cogentin to 1 mg nightly referral to psychiatry for assistance in management  Orders:    OXcarbazepine (Trileptal) 600 mg tablet; Take 1 tablet (600 mg) by mouth 2 times a day.    Hypertensive cardiomyopathy, with heart failure  Well compensated continue to monitor closely with lab work continues on carvedilol 25 mg twice a day with losartan 100 mg daily and amlodipine 5 mg daily continue hydrochlorothiazide spironolactone 25/25 1 tablet daily  Orders:    Referral to [facility]; Future    Follow Up [facility] - PCP - Established; Future    CBC and Auto Differential; Future    Comprehensive Metabolic Panel; Future    Hemoglobin A1C; Future    Lipid Panel; Future    Albumin-Creatinine Ratio, Urine Random; Future    Referral to Psychiatry; Future    Vitamin D 25-Hydroxy,Total (for eval of Vitamin D levels); Future    benztropine (Cogentin) 1 mg tablet; Take 1 tablet (1 mg) by mouth once daily.    Follow Up [facility] - PCP - Established; Future    IGT (impaired glucose tolerance)  Reevaluate fasting blood work this week and follow-up in 3 months [facility] [place]  Orders:    Referral to [facility]; Future    Follow Up [facility] - PCP - Established; Future    CBC  and Auto Differential; Future    Comprehensive Metabolic Panel; Future    Hemoglobin A1C; Future    Lipid Panel; Future    Albumin-Creatinine Ratio, Urine Random; Future    Referral to Psychiatry; Future    Vitamin D 25-Hydroxy,Total (for eval of Vitamin D levels); Future    benztropine (Cogentin) 1 mg tablet; Take 1 tablet (1 mg) by mouth once daily.    Needs smoking cessation education  Smoking cessation given on trying to cut down further       Nicotine dependence with current use  Work on reducing cigarette intake to less than 20 cigarettes a day continue with lung cancer screening              Tobacco Counseling  5 - 10 minutes were spent counseling the patient on tobacco cessation.  Benefits of cessation were discussed as well as techniques to help quit.      Tobacco Counseling  5 - 10 minutes were spent counseling the patient on tobacco cessation.  Benefits of cessation were discussed as well as techniques to help quit.

## 2025-06-20 NOTE — PROGRESS NOTES
"Subjective   Patient ID: Danielle Dickinson is a 65 y.o. female who presents for Follow-up (Requesting [facility] referral ).    HPI     Review of Systems    Objective   /84   Pulse 67   Ht 1.626 m (5' 4\")   Wt 60.2 kg (132 lb 12.8 oz)   SpO2 98%   BMI 22.80 kg/m²     Physical Exam    Assessment/Plan          "

## 2025-06-20 NOTE — ASSESSMENT & PLAN NOTE
Reevaluate with lab work  Orders:    ergocalciferol (Vitamin D-2) 1250 mcg (50,000 units) capsule; Take 1 capsule (50,000 Units) by mouth 1 (one) time per week.    Referral to [facility]; Future    Follow Up [facility] - PCP - Established; Future    CBC and Auto Differential; Future    Comprehensive Metabolic Panel; Future    Hemoglobin A1C; Future    Lipid Panel; Future    Albumin-Creatinine Ratio, Urine Random; Future    Referral to Psychiatry; Future    Vitamin D 25-Hydroxy,Total (for eval of Vitamin D levels); Future    benztropine (Cogentin) 1 mg tablet; Take 1 tablet (1 mg) by mouth once daily.

## 2025-06-20 NOTE — ASSESSMENT & PLAN NOTE
Well compensated continue to monitor closely with lab work continues on carvedilol 25 mg twice a day with losartan 100 mg daily and amlodipine 5 mg daily continue hydrochlorothiazide spironolactone 25/25 1 tablet daily  Orders:    Referral to [facility]; Future    Follow Up [facility] - PCP - Established; Future    CBC and Auto Differential; Future    Comprehensive Metabolic Panel; Future    Hemoglobin A1C; Future    Lipid Panel; Future    Albumin-Creatinine Ratio, Urine Random; Future    Referral to Psychiatry; Future    Vitamin D 25-Hydroxy,Total (for eval of Vitamin D levels); Future    benztropine (Cogentin) 1 mg tablet; Take 1 tablet (1 mg) by mouth once daily.    Follow Up In [facility] - PCP - Established; Future

## 2025-06-20 NOTE — ASSESSMENT & PLAN NOTE
Patient with rapid speech not sleeping well appears hypomanic.  Refill carbamazepine and increase Cogentin to 1 mg nightly referral to psychiatry for assistance in management  Orders:    OXcarbazepine (Trileptal) 600 mg tablet; Take 1 tablet (600 mg) by mouth 2 times a day.

## 2025-06-26 ENCOUNTER — HOSPITAL ENCOUNTER (OUTPATIENT)
Dept: RADIOLOGY | Facility: HOSPITAL | Age: 65
Discharge: HOME | End: 2025-06-26
Payer: COMMERCIAL

## 2025-06-26 DIAGNOSIS — F17.210 SMOKING GREATER THAN 40 PACK YEARS: ICD-10-CM

## 2025-06-26 PROCEDURE — 71271 CT THORAX LUNG CANCER SCR C-: CPT

## 2025-06-26 PROCEDURE — 71271 CT THORAX LUNG CANCER SCR C-: CPT | Performed by: RADIOLOGY

## 2025-07-08 LAB
25(OH)D3+25(OH)D2 SERPL-MCNC: 37 NG/ML (ref 30–100)
ALBUMIN SERPL-MCNC: 4.4 G/DL (ref 3.6–5.1)
ALP SERPL-CCNC: 67 U/L (ref 37–153)
ALT SERPL-CCNC: 15 U/L (ref 6–29)
ANION GAP SERPL CALCULATED.4IONS-SCNC: 12 MMOL/L (CALC) (ref 7–17)
AST SERPL-CCNC: 18 U/L (ref 10–35)
BASOPHILS # BLD AUTO: 22 CELLS/UL (ref 0–200)
BASOPHILS NFR BLD AUTO: 0.6 %
BILIRUB SERPL-MCNC: 0.6 MG/DL (ref 0.2–1.2)
BUN SERPL-MCNC: 20 MG/DL (ref 7–25)
CALCIUM SERPL-MCNC: 8.9 MG/DL (ref 8.6–10.4)
CHLORIDE SERPL-SCNC: 97 MMOL/L (ref 98–110)
CHOLEST SERPL-MCNC: 206 MG/DL
CHOLEST/HDLC SERPL: 3.3 (CALC)
CO2 SERPL-SCNC: 28 MMOL/L (ref 20–32)
CREAT SERPL-MCNC: 0.87 MG/DL (ref 0.5–1.05)
EGFRCR SERPLBLD CKD-EPI 2021: 74 ML/MIN/1.73M2
EOSINOPHIL # BLD AUTO: 61 CELLS/UL (ref 15–500)
EOSINOPHIL NFR BLD AUTO: 1.7 %
ERYTHROCYTE [DISTWIDTH] IN BLOOD BY AUTOMATED COUNT: 12.6 % (ref 11–15)
EST. AVERAGE GLUCOSE BLD GHB EST-MCNC: 103 MG/DL
EST. AVERAGE GLUCOSE BLD GHB EST-SCNC: 5.7 MMOL/L
GLUCOSE SERPL-MCNC: 96 MG/DL (ref 65–99)
HBA1C MFR BLD: 5.2 %
HCT VFR BLD AUTO: 44.8 % (ref 35–45)
HDLC SERPL-MCNC: 63 MG/DL
HGB BLD-MCNC: 14.6 G/DL (ref 11.7–15.5)
LDLC SERPL CALC-MCNC: 127 MG/DL (CALC)
LYMPHOCYTES # BLD AUTO: 1656 CELLS/UL (ref 850–3900)
LYMPHOCYTES NFR BLD AUTO: 46 %
MCH RBC QN AUTO: 32 PG (ref 27–33)
MCHC RBC AUTO-ENTMCNC: 32.6 G/DL (ref 32–36)
MCV RBC AUTO: 98.2 FL (ref 80–100)
MONOCYTES # BLD AUTO: 378 CELLS/UL (ref 200–950)
MONOCYTES NFR BLD AUTO: 10.5 %
NEUTROPHILS # BLD AUTO: 1483 CELLS/UL (ref 1500–7800)
NEUTROPHILS NFR BLD AUTO: 41.2 %
NONHDLC SERPL-MCNC: 143 MG/DL (CALC)
PLATELET # BLD AUTO: 288 THOUSAND/UL (ref 140–400)
PMV BLD REES-ECKER: 9 FL (ref 7.5–12.5)
POTASSIUM SERPL-SCNC: 3.2 MMOL/L (ref 3.5–5.3)
PROT SERPL-MCNC: 6.6 G/DL (ref 6.1–8.1)
RBC # BLD AUTO: 4.56 MILLION/UL (ref 3.8–5.1)
SODIUM SERPL-SCNC: 137 MMOL/L (ref 135–146)
TRIGL SERPL-MCNC: 69 MG/DL
WBC # BLD AUTO: 3.6 THOUSAND/UL (ref 3.8–10.8)

## 2025-07-31 ENCOUNTER — CLINICAL SUPPORT (OUTPATIENT)
Dept: NUTRITION | Facility: HOSPITAL | Age: 65
End: 2025-07-31
Payer: COMMERCIAL

## 2025-07-31 NOTE — PROGRESS NOTES
[facility]  Diet Recommendation 1: Heart Healthy  Diet Recommendation 2: Healthy Eating  Food Intolerance Avoidance: NKFA  Household Size: 1 Family Member  Interventions: Referral Number: 1st 6 Mo Referral 6 Mos  Interventions: Visit Number: 1 of 6 Visits - Max 6 Visits/Referral Each 6 Mo Period  Grains: 25-50% Whole  Fruit: % Fresh  Vegetables: Fresh - 100%  Proteins: 0 Plant-based Items  Dairy: 0-25% Lowfat  Relevant [facility] Inpatient Discharge Items: Took no dairy today  Originating Site of Referral Order: Dr Leavitt  Initials of RD Assisting Today: [facility]

## 2025-09-09 ENCOUNTER — APPOINTMENT (OUTPATIENT)
Dept: BEHAVIORAL HEALTH | Facility: CLINIC | Age: 65
End: 2025-09-09
Payer: COMMERCIAL

## 2025-09-26 ENCOUNTER — APPOINTMENT (OUTPATIENT)
Dept: PRIMARY CARE | Facility: CLINIC | Age: 65
End: 2025-09-26
Payer: COMMERCIAL

## 2026-01-22 ENCOUNTER — APPOINTMENT (OUTPATIENT)
Dept: PRIMARY CARE | Facility: CLINIC | Age: 66
End: 2026-01-22
Payer: COMMERCIAL